# Patient Record
Sex: FEMALE | Race: WHITE | Employment: FULL TIME | URBAN - METROPOLITAN AREA
[De-identification: names, ages, dates, MRNs, and addresses within clinical notes are randomized per-mention and may not be internally consistent; named-entity substitution may affect disease eponyms.]

---

## 2018-03-09 ENCOUNTER — HOSPITAL ENCOUNTER (OUTPATIENT)
Dept: MAMMOGRAPHY | Age: 58
Discharge: HOME | End: 2018-03-09
Attending: FAMILY MEDICINE
Payer: COMMERCIAL

## 2018-03-09 DIAGNOSIS — Z12.39 SCREENING BREAST EXAMINATION: ICD-10-CM

## 2018-03-09 PROCEDURE — 77067 SCR MAMMO BI INCL CAD: CPT

## 2019-03-15 ENCOUNTER — HOSPITAL ENCOUNTER (OUTPATIENT)
Dept: MAMMOGRAPHY | Age: 59
Discharge: HOME | End: 2019-03-15
Attending: FAMILY MEDICINE
Payer: COMMERCIAL

## 2019-03-15 DIAGNOSIS — Z12.31 ENCOUNTER FOR SCREENING MAMMOGRAM FOR MALIGNANT NEOPLASM OF BREAST: ICD-10-CM

## 2019-03-15 PROCEDURE — 77067 SCR MAMMO BI INCL CAD: CPT

## 2020-06-09 ENCOUNTER — HOSPITAL ENCOUNTER (OUTPATIENT)
Dept: GENERAL RADIOLOGY | Age: 60
Discharge: HOME | End: 2020-06-09
Attending: FAMILY MEDICINE
Payer: COMMERCIAL

## 2020-06-09 DIAGNOSIS — R06.89 DYSPNEA AND RESPIRATORY ABNORMALITY: ICD-10-CM

## 2020-06-09 DIAGNOSIS — R06.00 DYSPNEA AND RESPIRATORY ABNORMALITY: ICD-10-CM

## 2020-06-09 PROCEDURE — 71046 X-RAY EXAM CHEST 2 VIEWS: CPT

## 2020-06-11 ENCOUNTER — HOSPITAL ENCOUNTER (INPATIENT)
Age: 60
LOS: 1 days | Discharge: HOME | DRG: 244 | End: 2020-06-13
Attending: SPECIALIST | Admitting: SPECIALIST
Payer: COMMERCIAL

## 2020-06-11 DIAGNOSIS — R00.1 BRADYCARDIA: ICD-10-CM

## 2020-06-11 DIAGNOSIS — R94.31 ECG ABNORMAL: ICD-10-CM

## 2020-06-11 PROBLEM — I44.1 SECOND DEGREE AV BLOCK, MOBITZ TYPE II: Status: ACTIVE | Noted: 2020-06-11

## 2020-06-11 LAB
ANION GAP SERPL CALC-SCNC: 3 MMOL/L (ref 5–15)
ATRIAL RATE: 78 BPM
BUN SERPL-MCNC: 10 MG/DL (ref 6–20)
BUN/CREAT SERPL: 11 (ref 12–20)
CALCIUM SERPL-MCNC: 9.1 MG/DL (ref 8.5–10.1)
CALCULATED P AXIS, ECG09: 67 DEGREES
CALCULATED R AXIS, ECG10: 19 DEGREES
CALCULATED T AXIS, ECG11: 62 DEGREES
CHLORIDE SERPL-SCNC: 107 MMOL/L (ref 97–108)
CO2 SERPL-SCNC: 29 MMOL/L (ref 21–32)
CREAT SERPL-MCNC: 0.95 MG/DL (ref 0.55–1.02)
DIAGNOSIS, 93000: NORMAL
ERYTHROCYTE [DISTWIDTH] IN BLOOD BY AUTOMATED COUNT: 12.5 % (ref 11.5–14.5)
GLUCOSE SERPL-MCNC: 98 MG/DL (ref 65–100)
HCT VFR BLD AUTO: 45.3 % (ref 35–47)
HGB BLD-MCNC: 15.8 G/DL (ref 11.5–16)
MAGNESIUM SERPL-MCNC: 2.4 MG/DL (ref 1.6–2.4)
MCH RBC QN AUTO: 31.6 PG (ref 26–34)
MCHC RBC AUTO-ENTMCNC: 34.9 G/DL (ref 30–36.5)
MCV RBC AUTO: 90.6 FL (ref 80–99)
NRBC # BLD: 0 K/UL (ref 0–0.01)
NRBC BLD-RTO: 0 PER 100 WBC
P-R INTERVAL, ECG05: 204 MS
PLATELET # BLD AUTO: 296 K/UL (ref 150–400)
PMV BLD AUTO: 10.7 FL (ref 8.9–12.9)
POTASSIUM SERPL-SCNC: 4.1 MMOL/L (ref 3.5–5.1)
Q-T INTERVAL, ECG07: 496 MS
QRS DURATION, ECG06: 78 MS
QTC CALCULATION (BEZET), ECG08: 399 MS
RBC # BLD AUTO: 5 M/UL (ref 3.8–5.2)
SODIUM SERPL-SCNC: 139 MMOL/L (ref 136–145)
T4 FREE SERPL-MCNC: 1.1 NG/DL (ref 0.8–1.5)
TSH SERPL DL<=0.05 MIU/L-ACNC: 3.07 UIU/ML (ref 0.36–3.74)
VENTRICULAR RATE, ECG03: 39 BPM
WBC # BLD AUTO: 5.6 K/UL (ref 3.6–11)

## 2020-06-11 PROCEDURE — 99152 MOD SED SAME PHYS/QHP 5/>YRS: CPT | Performed by: SPECIALIST

## 2020-06-11 PROCEDURE — 85027 COMPLETE CBC AUTOMATED: CPT

## 2020-06-11 PROCEDURE — B2111ZZ FLUOROSCOPY OF MULTIPLE CORONARY ARTERIES USING LOW OSMOLAR CONTRAST: ICD-10-PCS | Performed by: SPECIALIST

## 2020-06-11 PROCEDURE — 80048 BASIC METABOLIC PNL TOTAL CA: CPT

## 2020-06-11 PROCEDURE — 99218 HC RM OBSERVATION: CPT

## 2020-06-11 PROCEDURE — 83735 ASSAY OF MAGNESIUM: CPT

## 2020-06-11 PROCEDURE — C1760 CLOSURE DEV, VASC: HCPCS | Performed by: SPECIALIST

## 2020-06-11 PROCEDURE — 77030029065 HC DRSG HEMO QCLOT ZMED -B: Performed by: SPECIALIST

## 2020-06-11 PROCEDURE — 84439 ASSAY OF FREE THYROXINE: CPT

## 2020-06-11 PROCEDURE — 86618 LYME DISEASE ANTIBODY: CPT

## 2020-06-11 PROCEDURE — 93005 ELECTROCARDIOGRAM TRACING: CPT

## 2020-06-11 PROCEDURE — 84443 ASSAY THYROID STIM HORMONE: CPT

## 2020-06-11 PROCEDURE — 36415 COLL VENOUS BLD VENIPUNCTURE: CPT

## 2020-06-11 PROCEDURE — 93458 L HRT ARTERY/VENTRICLE ANGIO: CPT | Performed by: SPECIALIST

## 2020-06-11 PROCEDURE — 4A023N7 MEASUREMENT OF CARDIAC SAMPLING AND PRESSURE, LEFT HEART, PERCUTANEOUS APPROACH: ICD-10-PCS | Performed by: SPECIALIST

## 2020-06-11 PROCEDURE — 74011636320 HC RX REV CODE- 636/320: Performed by: SPECIALIST

## 2020-06-11 PROCEDURE — 77030004532 HC CATH ANGI DX IMP BSC -A: Performed by: SPECIALIST

## 2020-06-11 PROCEDURE — 77030003390 HC NDL ANGI MRTM -A: Performed by: SPECIALIST

## 2020-06-11 PROCEDURE — 74011250637 HC RX REV CODE- 250/637: Performed by: INTERNAL MEDICINE

## 2020-06-11 PROCEDURE — 77030018729 HC ELECTRD DEFIB PAD CARD -B: Performed by: SPECIALIST

## 2020-06-11 PROCEDURE — 74011000250 HC RX REV CODE- 250: Performed by: SPECIALIST

## 2020-06-11 PROCEDURE — C1894 INTRO/SHEATH, NON-LASER: HCPCS | Performed by: SPECIALIST

## 2020-06-11 PROCEDURE — 74011250636 HC RX REV CODE- 250/636: Performed by: SPECIALIST

## 2020-06-11 RX ORDER — HEPARIN SODIUM 200 [USP'U]/100ML
INJECTION, SOLUTION INTRAVENOUS
Status: COMPLETED | OUTPATIENT
Start: 2020-06-11 | End: 2020-06-11

## 2020-06-11 RX ORDER — ATORVASTATIN CALCIUM 20 MG/1
20 TABLET, FILM COATED ORAL
Status: DISCONTINUED | OUTPATIENT
Start: 2020-06-11 | End: 2020-06-13 | Stop reason: HOSPADM

## 2020-06-11 RX ORDER — FENTANYL CITRATE 50 UG/ML
INJECTION, SOLUTION INTRAMUSCULAR; INTRAVENOUS AS NEEDED
Status: DISCONTINUED | OUTPATIENT
Start: 2020-06-11 | End: 2020-06-11 | Stop reason: HOSPADM

## 2020-06-11 RX ORDER — SODIUM CHLORIDE 0.9 % (FLUSH) 0.9 %
5-40 SYRINGE (ML) INJECTION AS NEEDED
Status: DISCONTINUED | OUTPATIENT
Start: 2020-06-11 | End: 2020-06-13 | Stop reason: HOSPADM

## 2020-06-11 RX ORDER — MIDAZOLAM HYDROCHLORIDE 1 MG/ML
INJECTION, SOLUTION INTRAMUSCULAR; INTRAVENOUS AS NEEDED
Status: DISCONTINUED | OUTPATIENT
Start: 2020-06-11 | End: 2020-06-11 | Stop reason: HOSPADM

## 2020-06-11 RX ORDER — SODIUM CHLORIDE 9 MG/ML
125 INJECTION, SOLUTION INTRAVENOUS CONTINUOUS
Status: DISPENSED | OUTPATIENT
Start: 2020-06-11 | End: 2020-06-11

## 2020-06-11 RX ORDER — HYDROCORTISONE SODIUM SUCCINATE 100 MG/2ML
100 INJECTION, POWDER, FOR SOLUTION INTRAMUSCULAR; INTRAVENOUS
Status: ACTIVE | OUTPATIENT
Start: 2020-06-11 | End: 2020-06-12

## 2020-06-11 RX ORDER — DIPHENHYDRAMINE HYDROCHLORIDE 50 MG/ML
25 INJECTION, SOLUTION INTRAMUSCULAR; INTRAVENOUS
Status: ACTIVE | OUTPATIENT
Start: 2020-06-11 | End: 2020-06-12

## 2020-06-11 RX ORDER — SODIUM CHLORIDE 0.9 % (FLUSH) 0.9 %
5-40 SYRINGE (ML) INJECTION EVERY 8 HOURS
Status: DISCONTINUED | OUTPATIENT
Start: 2020-06-11 | End: 2020-06-13 | Stop reason: HOSPADM

## 2020-06-11 RX ORDER — LIDOCAINE HYDROCHLORIDE 10 MG/ML
INJECTION INFILTRATION; PERINEURAL AS NEEDED
Status: DISCONTINUED | OUTPATIENT
Start: 2020-06-11 | End: 2020-06-11 | Stop reason: HOSPADM

## 2020-06-11 RX ORDER — PANTOPRAZOLE SODIUM 40 MG/1
40 TABLET, DELAYED RELEASE ORAL
Status: DISCONTINUED | OUTPATIENT
Start: 2020-06-12 | End: 2020-06-13 | Stop reason: HOSPADM

## 2020-06-11 RX ORDER — ATORVASTATIN CALCIUM 10 MG/1
10 TABLET, FILM COATED ORAL DAILY
COMMUNITY

## 2020-06-11 RX ADMIN — ATORVASTATIN CALCIUM 20 MG: 20 TABLET, FILM COATED ORAL at 21:57

## 2020-06-11 RX ADMIN — Medication 10 ML: at 21:58

## 2020-06-11 NOTE — CONSULTS
HISTORY OF PRESENTING ILLNESS      Shamir Montelongo is a 61 y.o. female with hypertension, hypercholesterolemia and recent complaints of cp, fatigue and sob. Positive family hx for CAD. She had normal LVF on echocardiogram with Dr. Alesha Tran 6/10/200. She also had EKG showing 2nd degree AVB 2:1. She presented today for cardiac catheterization which showed normal coronaries and an EF of 70%. EKG in post procedure area showing 2:1 heart block with heart rates ranging 35-42bpm. Patient remains very symptomatic. Symptoms began 3 weeks ago. She denies any additional illnesses or medical hx since that time. She does endorse pulling 5 ticks off of her body 4-5 weeks ago. Today's labwork unrevealing.       ACTIVE PROBLEM LIST     Patient Active Problem List    Diagnosis Date Noted    Second degree AV block, Mobitz type II 06/11/2020     Priority: 1 - One    History of recurrent UTIs 04/14/2014    Unspecified vitamin D deficiency 11/05/2012    Fibromyalgia 05/05/2012    Rheumatoid factor positive 05/05/2012    Symptomatic menopausal or female climacteric states 02/24/2012    Insomnia, unspecified 05/23/2010    Pure hypercholesterolemia 11/13/2009           PAST MEDICAL HISTORY     Past Medical History:   Diagnosis Date    Fibromyalgia     GERD (gastroesophageal reflux disease)     Kidney stone     Pure hypercholesterolemia     Second degree AV block, Mobitz type II 6/11/2020           PAST SURGICAL HISTORY     Past Surgical History:   Procedure Laterality Date    HX GYN  1999    Hysterectomy    HX GYN      tubal    HX ORTHOPAEDIC      right elbow           ALLERGIES     No Known Allergies       FAMILY HISTORY     Family History   Problem Relation Age of Onset    Cancer Mother         stomach    Asthma Father     Heart Disease Father     Asthma Brother     negative for cardiac disease       SOCIAL HISTORY     Social History     Socioeconomic History    Marital status:      Spouse name: Not on file    Number of children: Not on file    Years of education: Not on file    Highest education level: Not on file   Tobacco Use    Smoking status: Former Smoker     Last attempt to quit: 1991     Years since quittin.9    Smokeless tobacco: Never Used   Substance and Sexual Activity    Alcohol use: Yes     Comment: occasionally    Drug use: No    Sexual activity: Yes     Partners: Male         MEDICATIONS     Current Facility-Administered Medications   Medication Dose Route Frequency    0.9% sodium chloride infusion  125 mL/hr IntraVENous CONTINUOUS    sodium chloride (NS) flush 5-40 mL  5-40 mL IntraVENous Q8H    sodium chloride (NS) flush 5-40 mL  5-40 mL IntraVENous PRN    diphenhydrAMINE (BENADRYL) injection 25 mg  25 mg IntraVENous ONCE PRN    hydrocortisone Sod Succ (PF) (SOLU-CORTEF) injection 100 mg  100 mg IntraVENous ONCE PRN       I have reviewed the nurses notes, vitals, problem list, allergy list, medical history, family, social history and medications. REVIEW OF SYMPTOMS      General: +fatigue, presyncope, Pt denies excessive weight gain or loss. Pt is able to conduct ADL's  HEENT: Denies blurred vision, headaches, hearing loss, epistaxis and difficulty swallowing. Respiratory: +sob, Denies cough, congestion, shortness of breath, JUNG, wheezing or stridor. Cardiovascular: +cp, Denies, palpitations, edema or PND  Gastrointestinal: Denies poor appetite, indigestion, abdominal pain or blood in stool  Genitourinary: Denies hematuria, dysuria, increased urinary frequency  Musculoskeletal: Denies joint pain or swelling from muscles or joints  Neurologic: Denies tremor, paresthesias, headache, or sensory motor disturbance  Psychiatric: Denies confusion, insomnia, depression  Integumentray: Denies rash, itching or ulcers.   Hematologic: Denies easy bruising, bleeding       PHYSICAL EXAMINATION      Vitals:    20 0915 20 1145   BP: 169/54 (!) 124/98   Pulse: 70 (!) 50   Resp: 10 19   Temp: 97.8 °F (36.6 °C)    SpO2: 98% (!) 86%   Weight: 211 lb 10.3 oz (96 kg)    Height: 6' (1.829 m)      General: Well developed, in no acute distress. HEENT: No jaundice, oral mucosa moist, no oral ulcers  Neck: Supple, no stiffness, no lymphadenopathy, supple  Heart:  Normal S1/S2 negative S3 or S4. Regular, no murmur, gallop or rub, no jugular venous distention  Respiratory: Clear bilaterally x 4, no wheezing or rales  Abdomen:   Soft, non-tender, bowel sounds are active.   Extremities:  No edema, normal cap refill, no cyanosis. Musculoskeletal: No clubbing, no deformities  Neuro: A&Ox3, speech clear, gait stable, cooperative, no focal neurologic deficits  Skin: Skin color is normal. No rashes or lesions. Non diaphoretic, moist.  Vascular: 2+ pulses symmetric in all extremities       DIAGNOSTIC DATA      EK:1 AVB       LABORATORY DATA      Lab Results   Component Value Date/Time    WBC 5.6 2020 09:14 AM    HGB 15.8 2020 09:14 AM    HCT 45.3 2020 09:14 AM    PLATELET 667  09:14 AM    MCV 90.6 2020 09:14 AM      Lab Results   Component Value Date/Time    Sodium 139 2020 09:14 AM    Potassium 4.1 2020 09:14 AM    Chloride 107 2020 09:14 AM    CO2 29 2020 09:14 AM    Anion gap 3 (L) 2020 09:14 AM    Glucose 98 2020 09:14 AM    BUN 10 2020 09:14 AM    Creatinine 0.95 2020 09:14 AM    BUN/Creatinine ratio 11 (L) 2020 09:14 AM    GFR est AA >60 2020 09:14 AM    GFR est non-AA >60 2020 09:14 AM    Calcium 9.1 2020 09:14 AM    Bilirubin, total 0.5 2014 11:14 AM    Alk. phosphatase 41 2014 11:14 AM    Protein, total 6.0 2014 11:14 AM    Albumin 4.0 2014 11:14 AM    Globulin 2.9 10/28/2010 11:36 AM    A-G Ratio 2.0 2014 11:14 AM    ALT (SGPT) 27 2014 11:14 AM           ASSESSMENT      1. Second Degree AVB 2:1  2. Hypertension  3. Fatigue  4. Pre-syncope       PLAN     Plan to admit for possible dual chamber pacer with [employer] tomorrow morning. Discussed plan with nursing. OK to resume home meds aside from [employer], cardiac diet, NPO after midnight, left arm PIV. Stat lyme titer, TSH, and Mag. Will update Dr. Denise Castellanos on consult and current plan. Discuss plan with patient who is in agreement and will notify her  via cell phone. FOLLOW-UP       Thank you, Stephanie Elizabeth MD and Dr. Jersey Sauceda for allowing me to participate in the care of this extraordinarily pleasant female. Please do not hesitate to contact me for further questions/concerns.        Ellwood Hue, NP      [address]  Formerly named [facility] [address]  [phone] / [phone] Fax   [phone] / [phone] Fax

## 2020-06-11 NOTE — Clinical Note
TRANSFER - IN REPORT:     Verbal report received from: ernst. Report consisted of patient's Situation, Background, Assessment and   Recommendations(SBAR). Opportunity for questions and clarification was provided. Assessment completed upon patient's arrival to unit and care assumed. Patient transported with a Registered Nurse and [address] [place] [address] / [facility] [employer].

## 2020-06-11 NOTE — H&P
Date of Surgery Update:  Sharla Ferguson was seen and examined. History and physical has been reviewed. The patient has been examined. There have been no significant clinical changes since the completion of the originally dated History and Physical.    Signed By: Jole Bajwa MD     June 11, 2020 9:19 AM       CP, fatigue  Normal LVF on echo. 2nd degree AVB on EKG: may ask Maritza to see. Cecily Irwin 1960   Office/Outpatient Visit  Visit Date: Wed, Timur 10, 2020 3:00 pm  Provider: Iraida Sevilla MD (Assistant: Staci Huang RN )  Location: [facility])[address] [place]. [address] [phone]    Electronically signed by Anastacia Elias MD on  06/10/2020 04:33:02 PM                           Subjective:    CC: Ms. Ryder Putnam is a 61year old White female. Her primary care physician is Domenica Isaac MD.  She is here to follow up with the doctor regarding previous testing, echocardiogram - 6- - today. She presents today with a complaint of shortness of breath. The primary reason for today's visit is evaluation of the following: abnormal EKG on 6- - today with echo, hypercholesterolemia, and essential hypertension. No EKG done today    HPI:         MD Notes: sinus, 2nd degree AV block, 2:1 vs. non-condcuted PAC's Abnormal electrocardiogram [ECG] [EKG] noted. Regarding hypertension:  Type Primary Hypertension           Hypercholesterolemia: Current treatment includes Crestor and diet. Regarding dyspnea/shortness of breath: This has been noted for the past month. Associated symptoms include chest tightness, arm/leg heaviness/weak and dizzines, faint feeling. This tends to be worse with exertion. The shortness of breath is better rest.            Regarding chest pain:  MD Notes: Father had CABG and stents in his 52's   The discomfort is located primarily in the center of the chest.  The pain initially began months ago.   She characterizes the pain as tightness. It seems to be worse with exertion. Pain improves with rest.  Associated symptoms include dyspnea and fatigue. Paplitations/Arrhythmias:  Regarding cardiac arrhythmia (unspecified): She describes the sensation as flutter. The average duration of each episode is less than one minute. Regarding dyspnea/shortness of breath: This tends to be worse with exertion. The shortness of breath is better rest.  Echo: EF 60%, milld MR,TR. Bradycardia, unspecified noted. Associated symptoms include dizziness and near syncope. She denies syncope. Coronary Artery Disease: Current symptoms include angina, chest pain and shortness of breath. Atrioventricular block, second degree noted. Associated symptoms include dizziness and near syncope. She denies syncope. ROS:   GENERAL:  Denies recent weight gain or weight loss. EYES:  Denies double vision. ENT:  Denies tinnitus. No nose bleeding. ENDOCRINE:  Negative for temperature intolerances and excessive sweating. CARDIOVASCULAR:  Please see HPI. RESPIRATORY:   No chronic cough, hemoptysis or pleuritic pain. GASTROINTESTINAL:  Positive for acid reflux symptoms. :  No dysuria, polyuria or hematuria. HEMATOLOGIC/LYMPHATIC:  Negative for easy bruising and excessive bleeding. NEUROLOGICAL:  Negative for seizures and vertigo.       Past Medical History / Family History / Social History:     Last Reviewed on 6/10/2020 03:31 PM by Pete Howard  Past Medical History:     Hypercholesterolemia  Hypertension   Gastroesophageal Reflux Disease   Fibromyalgia  Osteoarthritis   Migraine Headaches  Vertigo   Chronic anemia Chronic anxiety Bipolar disorder Depression Insomnia   Vitamin D def  Sinusitis  Allergic rhinitis     Surgical History:   Surgical/Procedural History:   Hysterectomy: with Oopherectomy  R arm surgery  Eye surgery     Family History:   Father:   ; Positive for Coronary Artery Disease and bypass, stents;     Social History:   Social History:   Marital Status:    Children: 2 children     Tobacco/Alcohol/Supplements:   Last Reviewed on 6/10/2020 03:31 PM by Mignon Gonzalez  TOBACCO/ALCOHOL/SUPPLEMENTS   Tobacco: Past history of cigarette smoking, but has quit. Alcohol: Drinks alcohol occasionally. Caffeine:  She admits to consuming caffeine via coffee ( 2 servings per day ). Substance Abuse History:   Last Reviewed on 6/10/2020 03:31 PM by Mignon Perez  Substance Use/Abuse:   None     Mental Health History:   Last Reviewed on 6/10/2020 03:31 PM by Mignon Perez    Communicable Diseases (eg STDs): Last Reviewed on 6/10/2020 03:31 PM by Mignon Perez    Current Problems:   Last Reviewed on 6/10/2020 03:31 PM by Mignon Perez  Fatigue  Pure hypercholesterolemia  Essential (primary) hypertension  Bradycardia, unspecified  Shortness of breath  Chest pain, unspecified  Dizziness and giddiness  Malaise and fatigue  Abnormal electrocardiogram [ECG] [EKG]    Allergies:   Last Reviewed on 6/10/2020 03:31 PM by Mignon Perez  No Known Allergies.     Current Medications:   Last Reviewed on 6/10/2020 03:31 PM by Mignon Perez  ibuprofen 800 mg oral tablet [take 1 tablet (800 mg) by oral route every 8 hrs prn]  DULoxetine 60 mg oral capsule,delayed release (enteric coated) [take 1 capsule (60 mg) by oral route once daily]  omeprazole 40 mg oral capsule,delayed release (enteric coated) [take 1 capsule (40 mg) by oral route once daily before a meal]  rosuvastatin 10 mg oral tablet [take 1 tablet (10 mg) by oral route once daily]  budesonide-formoterol 160-4.5 mcg/actuation Inhalation HFA Aerosol Inhaler [inhale 2 puffs by inhalation route 2 times per day in the morning andevening]  valACYclovir 1 gram oral tablet [take 1 tablet (1,000 mg) by oral route 2 times per day prn]  zolpidem 10 mg oral tablet [take 1 tablet (10 mg) by oral route once daily at bedtime]  montelukast 10 mg oral tablet [take 1 tablet (10 mg) by oral route once daily in the evening]  Myrbetriq 50 mg oral Tablet, Extended Release 24 hr [take 1 tablet (50 mg) by oral route once daily swallowing whole with water. Do not crush, chew and/or divide.]  SUMAtriptan  [100mg prn]  tamsulosin 0.4 mg oral capsule [prn]  meclizine 25 mg oral tablet [take 1 tablet (25 mg) by oral route 1 hour before exposure to motion prn]    Objective:    Vitals:     Current: 6/10/2020 3:24:56 PM  Ht:  6 ft, 0 in; Wt: 213 lbs;  BMI: 28. 9BP: 195/74 mm Hg (left arm, sitting);  P: 43 bpm    Repeat:   3:28:20 PM  BP:   201/81mm Hg (left arm, standing)   Exams:   GENERAL:  Alert, oriented to person, place and time. HEENT:  Pinkish palpebral  conjunctivae. Anicteric sclerae. NECK:  No jugular vein engorgement. No bruit. CHEST: Equal expansion. Clear breath sounds. No rales, no wheezing. Heart: Grade 2/6 systolic ejection murmur at the left sternal border area. Bradycardic but regular   ABDOMEN:  Soft. Normal active bowel sounds. No tenderness. EXTREMITIES:  No pitting pedal edema. Equal pulses bilaterally. NEURO:  Grossly intact.       Assessment:     R94.31   Abnormal electrocardiogram [ECG] [EKG]     I10   Essential (primary) hypertension     E78.0   Pure hypercholesterolemia     R06.02   Shortness of breath     R07.2   Precordial pain     R00.2   Palpitations     R06.00   Dyspnea, unspecified     R00.1   Bradycardia, unspecified     I25.110   Atherosclerotic heart disease of native coronary artery with unstable angina pectoris     I44.1   Atrioventricular block, second degree       ORDERS:     Procedures Ordered:     72686  Education and train for pt self-mgmt by qualified, nonphysician, june 30 minutes; individual pt  (Send-Out)          XCATH  Cardiac Cath  (In-House)          X  Holter Monitor  (In-House)          Other Orders:     [id number]  Queried Patient for Tobacco Use  (Send-Out)            LDL-C < 100 mg/dL (Send-Out)          4086F  Aspirin or clopidogrel prescribed (CAD)  (Send-Out)              Plan:     Essential (primary) hypertension      Patient Education Handouts:     COV Heart Healthy Diet      COV Hypertension      Dyspnea, unspecified  CAD # 197: CAD Lipid Control LDL < 100 mg/dL # 6: CAD w/ Antiplatelet Therapy Aspirin or Clopidogrel Prescribed 1. Medication list has been reviewed. Start the following medication(s):  losartan, aspirin 81 mg daily. Smoking Status:  Nonsmoker   2. Advised the patient regarding diet, exercise, and lifestyle modification. Offered admission but declined. No driving. EP consult after cardiac cath. Explained indication for pacemaker. 3.  The patient to call the office if there is any change in her cardiac symptoms. 4.  Explained to the patient the importance of controlling her cardiac risk factors. Testing/Procedures:  Holter Monitor - to be done today - 24 hour Cardiac Catheterization  Explained to the patient the indication, procedure, risks, and benefits of cardiac catheterization. The patient understands  and wishes to proceed with the cath to be performed as an outpatient this week at [facility] by Dr. Juan Alberto Plascencia. Schedule a follow up appointment in 2 weeks. Orders:     [id number]  Queried Patient for Tobacco Use  (Send-Out)          23076  Education and train for pt self-mgmt by qualified, nonphysician, ea 30 minutes; individual pt  (Send-Out)          XCATH  Cardiac Cath  (In-House)          X  Holter Monitor  (In-House)            LDL-C < 100 mg/dL  (Send-Out)          4086F  Aspirin or clopidogrel prescribed (CAD)  (Send-Out)            Patient Recommendations: For  Dyspnea, unspecified:    1. Your medication list has been reviewed. Start the following medication(s):  losartan, aspirin 81 mg daily. 2.  You have been advised regarding diet, exercise, and lifestyle. modification.     3.  Please call the office if there is any change in your cardiac symptoms. 4.  Explained to you the importance of controlling your cardiac risk factors. You need to have the following test/procedure(s) done:  Holter monitor to be done today - 24 hour Cardiac Catheterization:  The patient understands and wishes to proceed with the cath to be performed as an outpatient this week. at [facility] by Dr. Juan Alberto Plascencia. Schedule a follow-up visit in 2 weeks.

## 2020-06-11 NOTE — PROGRESS NOTES
9290    Patient arrived. ID and allergies verified verbally with patient. Pt voices understanding of procedure to be performed. Consent obtained. Pt prepped for procedure. 1105    TRANSFER - OUT REPORT:    Verbal report given to Rocky Huerta RN (name) on Alpha Severe  being transferred to CATH LAB (unit) for ordered procedure       Report consisted of patients Situation, Background, Assessment and   Recommendations(SBAR). Information from the following report(s) SBAR was reviewed with the receiving nurse. Lines:       Opportunity for questions and clarification was provided. Patient transported with:   Registered Nurse          11:43 AM    TRANSFER - IN REPORT:    Verbal report received from RT Cecile (name) on Mohit Severe  being received from [facility] (unit) for routine progression of care      Report consisted of patients Situation, Background, Assessment and   Recommendations(SBAR). Information from the following report(s) Procedure Summary was reviewed with the receiving nurse. Opportunity for questions and clarification was provided. Assessment completed upon patients arrival to unit and care assumed. 11:50 AM    Patient arrived in [facility] 281 as low as 37 during procedure    Placed on defibrillator monitor   Hooked up to AP pads  Monitoring  Denies pain  Awaiting EP NP consult, NP aware patient in CLPO and HR      1210    NP in CLPO = new orders received  Patient awake and talking  Monitoring      12:58 PM    TRANSFER - OUT REPORT:    Verbal report given to Rafi Adam RN (name) on Alpha Severe  being transferred to ICU (unit) for routine progression of care       Report consisted of patients Situation, Background, Assessment and   Recommendations(SBAR). Information from the following report(s) SBAR, Procedure Summary and Cardiac Rhythm Second degree heart block type 2, PVCs was reviewed with the receiving nurse.     Lines:   Peripheral IV 06/11/20 Right Antecubital (Active)   Site Assessment Clean, dry, & intact 6/11/2020 12:15 PM   Phlebitis Assessment 0 6/11/2020 12:15 PM   Infiltration Assessment 0 6/11/2020 12:15 PM   Dressing Status Clean 6/11/2020 12:15 PM   Dressing Type Transparent 6/11/2020 12:15 PM   Hub Color/Line Status Pink; Infusing 6/11/2020 12:15 PM       Peripheral IV 06/11/20 Left Antecubital (Active)   Site Assessment Clean, dry, & intact 6/11/2020 12:45 PM   Phlebitis Assessment 0 6/11/2020 12:45 PM   Infiltration Assessment 0 6/11/2020 12:45 PM   Dressing Status Clean 6/11/2020 12:45 PM   Dressing Type Transparent 6/11/2020 12:45 PM   Hub Color/Line Status Pink 6/11/2020 12:45 PM        Opportunity for questions and clarification was provided.       Patient transported with:   Monitor   RNs

## 2020-06-11 NOTE — Clinical Note
TRANSFER - OUT REPORT:     Verbal report given to: Wiliam Parsons 61 . Report consisted of patient's Situation, Background, Assessment and   Recommendations(SBAR). Opportunity for questions and clarification was provided. Patient transported with a Registered Nurse. Patient transported to: [facility].

## 2020-06-11 NOTE — PROGRESS NOTES
1323- TRANSFER - IN REPORT:    Verbal report received from [facility], RN(name) on Flower Burgos  being received from cath lab(unit) for routine progression of care      Report consisted of patients Situation, Background, Assessment and   Recommendations(SBAR). Information from the following report(s) SBAR, Kardex, Intake/Output, MAR, Recent Results and Cardiac Rhythm sinus princess was reviewed with the receiving nurse. Opportunity for questions and clarification was provided. Assessment completed upon patients arrival to unit and care assumed. Pt extremely princess in 30s-40s. Doctors are aware. Dr. Deb Tellez aware. Pt on defibrillator in case of the need to externally pace pt. Pads on patient. Patient on monitor X3.       1500- pt  at bedside and is wearing a mask. 1620- pt comfortable. Talking on the phone. [address] Yoan Mg is the daughter, and plans to see pt tomorrow. 1900- Bedside and Verbal shift change report given to OUMOU Rose (oncoming nurse) by Sheryle Dus, RN (offgoing nurse). Report included the following information SBAR, Kardex, Intake/Output, MAR, Recent Results and Cardiac Rhythm 2nd degree type 2.

## 2020-06-11 NOTE — CONSULTS
[facility]     Name: Coty Holder MRN: [id number]   : 1960 Hospital: [facility]   Date: 2020        Impression Plan   1. Second degree heart blook  2. HLD   3. GERD               · S/P cardia cath  · EP consulted for possible pacemaker  · Pacer pads in place  · Home statin  · PPI  · NPO after midnight       Pt is critically ill in the setting of second degree heart block. Critical care time spent with pt exclusive of procedures was 30 minutes    Radiology  ( personally reviewed) CXR 2020 without acute cardiopulmonary process   ABG No results for input(s): PHI, PO2I, PCO2I in the last 72 hours. Subjective     Patient is a 61 y.o. female who is admitted to the ICU post-cath while she awaits pacemaker placement evaluation. She had been evaluated by Dr. Mian Umanzor for shortness of breath and abnormal EKG and underwent cardiac cath today that showed normal coronary arteries. She was also noted to have a second degree heart block and is to be evaluated by EP for a possible pacemaker. She denies any complaints of shortness of breath at rest, chest pain, palpitations, dizziness. Review of Systems:  A comprehensive review of systems was negative except for that written in the HPI. Past Medical History:   Diagnosis Date    Fibromyalgia     GERD (gastroesophageal reflux disease)     Kidney stone     Pure hypercholesterolemia     Second degree AV block, Mobitz type II 2020      Past Surgical History:   Procedure Laterality Date    HX GYN  1999    Hysterectomy    HX GYN      tubal    HX ORTHOPAEDIC      right elbow       Prior to Admission medications    Medication Sig Start Date End Date Taking? Authorizing Provider   atorvastatin (LIPITOR) 20 mg tablet Take 20 mg by mouth daily.    Yes Provider, Historical   zolpidem (AMBIEN) 10 mg tablet TAKE 1 TABLET BY MOUTH NIGHTLY AS NEEDED 3/30/14  Yes Shira Ramirez NP   butalbital-acetaminophen-caffeine (FIORICET) -40 mg per tablet Take 1 Tab by mouth every six (6) hours as needed for Headache. 3/20/14  Yes Brandon Walsh MD   valACYclovir (VALTREX) 1 g tablet Take 1 Tab by mouth two (2) times a day. 13  Yes Brandon Walsh MD   naproxen sodium (ALEVE) 220 mg cap Take  by mouth. Yes Provider, Historical   aspirin delayed-release 81 mg tablet Take  by mouth daily. Yes Provider, Historical   Dexlansoprazole (DEXILANT) 60 mg CpDM Take 1 Cap by mouth daily. 11  Yes Brandon Walsh MD     Current Facility-Administered Medications   Medication Dose Route Frequency    sodium chloride (NS) flush 5-40 mL  5-40 mL IntraVENous Q8H     No Known Allergies   Social History     Tobacco Use    Smoking status: Former Smoker     Last attempt to quit: 1991     Years since quittin.9    Smokeless tobacco: Never Used   Substance Use Topics    Alcohol use: Yes     Comment: occasionally      Family History   Problem Relation Age of Onset    Cancer Mother         stomach    Asthma Father     Heart Disease Father     Asthma Brother           Laboratory: I have personally reviewed the critical care flowsheet and labs. Recent Labs     20  0914   WBC 5.6   HGB 15.8   HCT 45.3        Recent Labs     20  1235 20  0914   NA  --  139   K  --  4.1   CL  --  107   CO2  --  29   GLU  --  98   BUN  --  10   CREA  --  0.95   CA  --  9.1   MG 2.4  --        Objective:     Mode Rate Tidal Volume Pressure FiO2 PEEP                    Vital Signs:     TMAX(24)      Intake/Output:   Last shift:         Last 3 shifts: No intake/output data recorded. [id number] intake or output data in the 24 hours ending 20 1501  EXAM:   GENERAL: well developed and in no distress, HEENT:  PERRL, EOMI, no alar flaring or epistaxis, oral mucosa moist without cyanosis, NECK:  no jugular vein distention, no retractions, no thyromegaly or masses, LUNGS: CTAB, respirations non-labored, HEART: Bradycardic but regular, with no MGR; no edema is present, ABDOMEN:  soft with no tenderness, bowel sounds present, EXTREMITIES:  warm with no cyanosis, SKIN:  no jaundice or ecchymosis and NEUROLOGIC:  alert and oriented, grossly non-focal    Rachelle Zapata MD  [facility] [address] [facility]

## 2020-06-11 NOTE — Clinical Note
Bilateral chest and draped. Wet prep solution applied at: 1125. Wet prep solution dried at: 1128. Wet prep elapsed drying time: 3 mins.

## 2020-06-11 NOTE — Clinical Note
TRANSFER - OUT REPORT:     Verbal report given to: Ophelia. Report consisted of patient's Situation, Background, Assessment and   Recommendations(SBAR). Opportunity for questions and clarification was provided. Patient transported with a Registered Nurse.

## 2020-06-11 NOTE — PROGRESS NOTES
Spiritual Care Assessment/Progress Note  [address]      NAME: Monica Kwong      MRN: [id number]  AGE: 61 y.o.  SEX: female  Mormonism Affiliation: Unknown   Language: English     6/11/2020     Total Time (in minutes): 8     Spiritual Assessment begun in [facility] 3 INTENSIVE CARE through conversation with:         [x]Patient        [] Family    [] Friend(s)        Reason for Consult: Initial/Spiritual assessment, patient floor     Spiritual beliefs: (Please include comment if needed)     [] Identifies with a alcira tradition:         [] Supported by a alcira community:            [] Claims no spiritual orientation:           [] Seeking spiritual identity:                [] Adheres to an individual form of spirituality:           [x] Not able to assess:                           Identified resources for coping:      [] Prayer                               [] Music                  [] Guided Imagery     [] Family/friends                 [] Pet visits     [] Devotional reading                         [x] Unknown     [] Other:                                    Interventions offered during this visit: (See comments for more details)    Patient Interventions: Affirmation of emotions/emotional suffering, Normalization of emotional/spiritual concerns, Initial/Spiritual assessment, patient floor, Initial visit           Plan of Care:     [] Support spiritual and/or cultural needs    [] Support AMD and/or advance care planning process      [] Support grieving process   [] Coordinate Rites and/or Rituals    [] Coordination with community clergy   [] No spiritual needs identified at this time   [] Detailed Plan of Care below (See Comments)  [] Make referral to Music Therapy  [] Make referral to Pet Therapy     [] Make referral to Addiction services  [] Make referral to [facility]  [] Make referral to Spiritual Care Partner  [] No future visits requested        [x] Follow up visits as needed     Comments:  visited pt, Miss Nohemy Boothe at the ICU for initial spiritual assessment. Pt was in her bed, alert and comfortable. She  welcomed  with a broad smile.  noticed after introducing himself that pt's lunch had just been served.  thus advised her of  availability as needed, and provided her space to have her lunchd. Pt thanked  for the visit. Spiritual care is still available as needed or upon request.     Visited by: Alexsander Jonas.   To sheila cam: [phone]

## 2020-06-11 NOTE — PROGRESS NOTES
Reason for Admission: For Pacemaker placement,second degree block,symptomatic bradycardia                   RUR Score:     observation                Plan for utilizing home health: There are no identified home health needs @ this rosales    PCP: First and Last name:  Dr Alisson Dennison   Name of Practice: Family Practice   Are you a current patient: Yes/No: yes   Approximate date of last visit: few months ago   Can you participate in a virtual visit with your PCP: yes                    Current Advanced Directive/Advance Care Plan: full code,no AMD on file                         Transition of Care Plan:                    I met with pt.'s  and pt as an introductory visit. .I reviewed the observation letter with pt / and pt received her copy. Pt has   Pt has [employer] insurance. Per ,the hope is for pt to have her pacemaker placed late tomorrow morning. At home ,pt has two entry steps into their [place] home. Pt is independent in all aspects. At this time,therwe are no identified discharge needs @ this time but will follow pt for disposition needs.     Enrico Meredith

## 2020-06-12 ENCOUNTER — APPOINTMENT (OUTPATIENT)
Dept: GENERAL RADIOLOGY | Age: 60
DRG: 244 | End: 2020-06-12
Attending: INTERNAL MEDICINE
Payer: COMMERCIAL

## 2020-06-12 PROBLEM — R07.9 CHEST PAIN: Status: ACTIVE | Noted: 2020-06-12

## 2020-06-12 LAB
ANION GAP SERPL CALC-SCNC: 3 MMOL/L (ref 5–15)
B BURGDOR IGG+IGM SER-ACNC: <0.91 ISR (ref 0–0.9)
BUN SERPL-MCNC: 7 MG/DL (ref 6–20)
BUN/CREAT SERPL: 10 (ref 12–20)
CALCIUM SERPL-MCNC: 7.2 MG/DL (ref 8.5–10.1)
CHLORIDE SERPL-SCNC: 113 MMOL/L (ref 97–108)
CO2 SERPL-SCNC: 26 MMOL/L (ref 21–32)
CREAT SERPL-MCNC: 0.71 MG/DL (ref 0.55–1.02)
ERYTHROCYTE [DISTWIDTH] IN BLOOD BY AUTOMATED COUNT: 12.5 % (ref 11.5–14.5)
GLUCOSE SERPL-MCNC: 81 MG/DL (ref 65–100)
HCT VFR BLD AUTO: 41.4 % (ref 35–47)
HGB BLD-MCNC: 14.2 G/DL (ref 11.5–16)
MAGNESIUM SERPL-MCNC: 1.8 MG/DL (ref 1.6–2.4)
MCH RBC QN AUTO: 31.1 PG (ref 26–34)
MCHC RBC AUTO-ENTMCNC: 34.3 G/DL (ref 30–36.5)
MCV RBC AUTO: 90.6 FL (ref 80–99)
NRBC # BLD: 0 K/UL (ref 0–0.01)
NRBC BLD-RTO: 0 PER 100 WBC
PHOSPHATE SERPL-MCNC: 3.6 MG/DL (ref 2.6–4.7)
PLATELET # BLD AUTO: 264 K/UL (ref 150–400)
PMV BLD AUTO: 11 FL (ref 8.9–12.9)
POTASSIUM SERPL-SCNC: 3.2 MMOL/L (ref 3.5–5.1)
RBC # BLD AUTO: 4.57 M/UL (ref 3.8–5.2)
SODIUM SERPL-SCNC: 142 MMOL/L (ref 136–145)
WBC # BLD AUTO: 7.2 K/UL (ref 3.6–11)

## 2020-06-12 PROCEDURE — 74011250637 HC RX REV CODE- 250/637: Performed by: INTERNAL MEDICINE

## 2020-06-12 PROCEDURE — 0JH606Z INSERTION OF PACEMAKER, DUAL CHAMBER INTO CHEST SUBCUTANEOUS TISSUE AND FASCIA, OPEN APPROACH: ICD-10-PCS | Performed by: INTERNAL MEDICINE

## 2020-06-12 PROCEDURE — 02HK3JZ INSERTION OF PACEMAKER LEAD INTO RIGHT VENTRICLE, PERCUTANEOUS APPROACH: ICD-10-PCS | Performed by: INTERNAL MEDICINE

## 2020-06-12 PROCEDURE — 99152 MOD SED SAME PHYS/QHP 5/>YRS: CPT | Performed by: INTERNAL MEDICINE

## 2020-06-12 PROCEDURE — 77030033138 HC SUT PGA STRATFX J&J -B: Performed by: INTERNAL MEDICINE

## 2020-06-12 PROCEDURE — 99153 MOD SED SAME PHYS/QHP EA: CPT | Performed by: INTERNAL MEDICINE

## 2020-06-12 PROCEDURE — 77030002933 HC SUT MCRYL J&J -A: Performed by: INTERNAL MEDICINE

## 2020-06-12 PROCEDURE — 99218 HC RM OBSERVATION: CPT

## 2020-06-12 PROCEDURE — 74011250636 HC RX REV CODE- 250/636: Performed by: NURSE PRACTITIONER

## 2020-06-12 PROCEDURE — 77030038613 HC SUT PDS STRATA SPIRL J&J -B: Performed by: INTERNAL MEDICINE

## 2020-06-12 PROCEDURE — C1893 INTRO/SHEATH, FIXED,NON-PEEL: HCPCS | Performed by: INTERNAL MEDICINE

## 2020-06-12 PROCEDURE — 74011250637 HC RX REV CODE- 250/637: Performed by: NURSE PRACTITIONER

## 2020-06-12 PROCEDURE — 77030018547 HC SUT ETHBND1 J&J -B: Performed by: INTERNAL MEDICINE

## 2020-06-12 PROCEDURE — 74011250636 HC RX REV CODE- 250/636

## 2020-06-12 PROCEDURE — 74011250636 HC RX REV CODE- 250/636: Performed by: INTERNAL MEDICINE

## 2020-06-12 PROCEDURE — C1785 PMKR, DUAL, RATE-RESP: HCPCS | Performed by: INTERNAL MEDICINE

## 2020-06-12 PROCEDURE — 74011000272 HC RX REV CODE- 272: Performed by: INTERNAL MEDICINE

## 2020-06-12 PROCEDURE — 77030018729 HC ELECTRD DEFIB PAD CARD -B: Performed by: INTERNAL MEDICINE

## 2020-06-12 PROCEDURE — 71045 X-RAY EXAM CHEST 1 VIEW: CPT

## 2020-06-12 PROCEDURE — C1898 LEAD, PMKR, OTHER THAN TRANS: HCPCS | Performed by: INTERNAL MEDICINE

## 2020-06-12 PROCEDURE — 77030018673: Performed by: INTERNAL MEDICINE

## 2020-06-12 PROCEDURE — 33208 INSRT HEART PM ATRIAL & VENT: CPT | Performed by: INTERNAL MEDICINE

## 2020-06-12 PROCEDURE — 84100 ASSAY OF PHOSPHORUS: CPT

## 2020-06-12 PROCEDURE — 65660000000 HC RM CCU STEPDOWN

## 2020-06-12 PROCEDURE — 80048 BASIC METABOLIC PNL TOTAL CA: CPT

## 2020-06-12 PROCEDURE — 77030041279 HC DRSG PRMSL AG MDII -B: Performed by: INTERNAL MEDICINE

## 2020-06-12 PROCEDURE — 74011636320 HC RX REV CODE- 636/320: Performed by: INTERNAL MEDICINE

## 2020-06-12 PROCEDURE — 02H63JZ INSERTION OF PACEMAKER LEAD INTO RIGHT ATRIUM, PERCUTANEOUS APPROACH: ICD-10-PCS | Performed by: INTERNAL MEDICINE

## 2020-06-12 PROCEDURE — 85027 COMPLETE CBC AUTOMATED: CPT

## 2020-06-12 PROCEDURE — 74011000250 HC RX REV CODE- 250: Performed by: INTERNAL MEDICINE

## 2020-06-12 PROCEDURE — 36415 COLL VENOUS BLD VENIPUNCTURE: CPT

## 2020-06-12 PROCEDURE — 83735 ASSAY OF MAGNESIUM: CPT

## 2020-06-12 DEVICE — IPG W1DR01 AZURE XT DR MRI WL USA BCP
Type: IMPLANTABLE DEVICE | Status: FUNCTIONAL
Brand: AZURE™ XT DR MRI SURESCAN™

## 2020-06-12 DEVICE — LEAD 5076-58 MRI US RCMCRD
Type: IMPLANTABLE DEVICE | Status: FUNCTIONAL
Brand: CAPSUREFIX NOVUS MRI™ SURESCAN®

## 2020-06-12 DEVICE — LEAD PCMKR CAPSUR FIX NOVUS 52 --: Type: IMPLANTABLE DEVICE | Status: FUNCTIONAL

## 2020-06-12 RX ORDER — MORPHINE SULFATE 2 MG/ML
2 INJECTION, SOLUTION INTRAMUSCULAR; INTRAVENOUS ONCE
Status: DISCONTINUED | OUTPATIENT
Start: 2020-06-12 | End: 2020-06-12

## 2020-06-12 RX ORDER — LABETALOL HCL 20 MG/4 ML
20 SYRINGE (ML) INTRAVENOUS
Status: DISCONTINUED | OUTPATIENT
Start: 2020-06-12 | End: 2020-06-13 | Stop reason: HOSPADM

## 2020-06-12 RX ORDER — LIDOCAINE HYDROCHLORIDE AND EPINEPHRINE 10; 10 MG/ML; UG/ML
INJECTION, SOLUTION INFILTRATION; PERINEURAL AS NEEDED
Status: DISCONTINUED | OUTPATIENT
Start: 2020-06-12 | End: 2020-06-12 | Stop reason: HOSPADM

## 2020-06-12 RX ORDER — ZOLPIDEM TARTRATE 5 MG/1
5 TABLET ORAL
Status: DISCONTINUED | OUTPATIENT
Start: 2020-06-12 | End: 2020-06-13 | Stop reason: HOSPADM

## 2020-06-12 RX ORDER — HYDROMORPHONE HYDROCHLORIDE 2 MG/ML
2 INJECTION, SOLUTION INTRAMUSCULAR; INTRAVENOUS; SUBCUTANEOUS ONCE
Status: DISCONTINUED | OUTPATIENT
Start: 2020-06-12 | End: 2020-06-12

## 2020-06-12 RX ORDER — CEFAZOLIN SODIUM 1 G/3ML
INJECTION, POWDER, FOR SOLUTION INTRAMUSCULAR; INTRAVENOUS AS NEEDED
Status: DISCONTINUED | OUTPATIENT
Start: 2020-06-12 | End: 2020-06-12 | Stop reason: HOSPADM

## 2020-06-12 RX ORDER — DULOXETIN HYDROCHLORIDE 60 MG/1
60 CAPSULE, DELAYED RELEASE ORAL
COMMUNITY

## 2020-06-12 RX ORDER — GENTAMICIN SULFATE 80 MG/100ML
80 INJECTION, SOLUTION INTRAVENOUS ONCE
Status: DISCONTINUED | OUTPATIENT
Start: 2020-06-12 | End: 2020-06-12

## 2020-06-12 RX ORDER — MIDAZOLAM HYDROCHLORIDE 1 MG/ML
INJECTION, SOLUTION INTRAMUSCULAR; INTRAVENOUS AS NEEDED
Status: DISCONTINUED | OUTPATIENT
Start: 2020-06-12 | End: 2020-06-12 | Stop reason: HOSPADM

## 2020-06-12 RX ORDER — DULOXETIN HYDROCHLORIDE 30 MG/1
60 CAPSULE, DELAYED RELEASE ORAL
Status: DISCONTINUED | OUTPATIENT
Start: 2020-06-12 | End: 2020-06-13 | Stop reason: HOSPADM

## 2020-06-12 RX ORDER — ONDANSETRON 2 MG/ML
4 INJECTION INTRAMUSCULAR; INTRAVENOUS
Status: DISCONTINUED | OUTPATIENT
Start: 2020-06-12 | End: 2020-06-13 | Stop reason: HOSPADM

## 2020-06-12 RX ORDER — CEFAZOLIN SODIUM 1 G/3ML
2 INJECTION, POWDER, FOR SOLUTION INTRAMUSCULAR; INTRAVENOUS ONCE
Status: DISCONTINUED | OUTPATIENT
Start: 2020-06-12 | End: 2020-06-12

## 2020-06-12 RX ORDER — ACETAMINOPHEN 325 MG/1
650 TABLET ORAL
Status: DISCONTINUED | OUTPATIENT
Start: 2020-06-12 | End: 2020-06-13 | Stop reason: HOSPADM

## 2020-06-12 RX ORDER — HEPARIN SODIUM 200 [USP'U]/100ML
INJECTION, SOLUTION INTRAVENOUS
Status: COMPLETED | OUTPATIENT
Start: 2020-06-12 | End: 2020-06-12

## 2020-06-12 RX ORDER — DIPHENHYDRAMINE HYDROCHLORIDE 50 MG/ML
INJECTION, SOLUTION INTRAMUSCULAR; INTRAVENOUS AS NEEDED
Status: DISCONTINUED | OUTPATIENT
Start: 2020-06-12 | End: 2020-06-12 | Stop reason: HOSPADM

## 2020-06-12 RX ORDER — HYDROCODONE BITARTRATE AND ACETAMINOPHEN 5; 325 MG/1; MG/1
1 TABLET ORAL
Status: DISCONTINUED | OUTPATIENT
Start: 2020-06-12 | End: 2020-06-13 | Stop reason: HOSPADM

## 2020-06-12 RX ORDER — GENTAMICIN SULFATE 80 MG/100ML
80 INJECTION, SOLUTION INTRAVENOUS ONCE
Status: COMPLETED | OUTPATIENT
Start: 2020-06-12 | End: 2020-06-12

## 2020-06-12 RX ORDER — BUPIVACAINE HYDROCHLORIDE 5 MG/ML
INJECTION, SOLUTION EPIDURAL; INTRACAUDAL AS NEEDED
Status: DISCONTINUED | OUTPATIENT
Start: 2020-06-12 | End: 2020-06-12 | Stop reason: HOSPADM

## 2020-06-12 RX ORDER — CEPHALEXIN 500 MG/1
500 CAPSULE ORAL 3 TIMES DAILY
Qty: 15 CAP | Refills: 0 | Status: SHIPPED | OUTPATIENT
Start: 2020-06-12 | End: 2020-06-17

## 2020-06-12 RX ORDER — HYDRALAZINE HYDROCHLORIDE 20 MG/ML
20 INJECTION INTRAMUSCULAR; INTRAVENOUS ONCE
Status: COMPLETED | OUTPATIENT
Start: 2020-06-12 | End: 2020-06-12

## 2020-06-12 RX ORDER — ONDANSETRON 2 MG/ML
INJECTION INTRAMUSCULAR; INTRAVENOUS AS NEEDED
Status: DISCONTINUED | OUTPATIENT
Start: 2020-06-12 | End: 2020-06-12 | Stop reason: HOSPADM

## 2020-06-12 RX ORDER — FENTANYL CITRATE 50 UG/ML
INJECTION, SOLUTION INTRAMUSCULAR; INTRAVENOUS AS NEEDED
Status: DISCONTINUED | OUTPATIENT
Start: 2020-06-12 | End: 2020-06-12 | Stop reason: HOSPADM

## 2020-06-12 RX ORDER — HYDRALAZINE HYDROCHLORIDE 20 MG/ML
INJECTION INTRAMUSCULAR; INTRAVENOUS
Status: COMPLETED
Start: 2020-06-12 | End: 2020-06-12

## 2020-06-12 RX ORDER — SODIUM CHLORIDE 0.9 % (FLUSH) 0.9 %
5-40 SYRINGE (ML) INJECTION EVERY 8 HOURS
Status: DISCONTINUED | OUTPATIENT
Start: 2020-06-12 | End: 2020-06-13 | Stop reason: HOSPADM

## 2020-06-12 RX ORDER — SODIUM CHLORIDE 0.9 % (FLUSH) 0.9 %
5-40 SYRINGE (ML) INJECTION AS NEEDED
Status: DISCONTINUED | OUTPATIENT
Start: 2020-06-12 | End: 2020-06-13 | Stop reason: HOSPADM

## 2020-06-12 RX ADMIN — ATORVASTATIN CALCIUM 20 MG: 20 TABLET, FILM COATED ORAL at 21:24

## 2020-06-12 RX ADMIN — ACETAMINOPHEN 650 MG: 325 TABLET ORAL at 14:14

## 2020-06-12 RX ADMIN — PANTOPRAZOLE SODIUM 40 MG: 40 TABLET, DELAYED RELEASE ORAL at 16:49

## 2020-06-12 RX ADMIN — HYDRALAZINE HYDROCHLORIDE 20 MG: 20 INJECTION INTRAMUSCULAR; INTRAVENOUS at 12:52

## 2020-06-12 RX ADMIN — DULOXETINE 60 MG: 30 CAPSULE, DELAYED RELEASE ORAL at 21:24

## 2020-06-12 RX ADMIN — MORPHINE SULFATE 2 MG: 2 INJECTION, SOLUTION INTRAMUSCULAR; INTRAVENOUS at 13:25

## 2020-06-12 RX ADMIN — ZOLPIDEM TARTRATE 5 MG: 5 TABLET ORAL at 21:24

## 2020-06-12 RX ADMIN — ACETAMINOPHEN 650 MG: 325 TABLET ORAL at 20:14

## 2020-06-12 RX ADMIN — HYDROCODONE BITARTRATE AND ACETAMINOPHEN 1 TABLET: 5; 325 TABLET ORAL at 16:49

## 2020-06-12 RX ADMIN — Medication 10 ML: at 21:24

## 2020-06-12 RX ADMIN — WATER 2 G: 1 INJECTION INTRAMUSCULAR; INTRAVENOUS; SUBCUTANEOUS at 18:05

## 2020-06-12 RX ADMIN — Medication 10 ML: at 21:25

## 2020-06-12 NOTE — PROGRESS NOTES
1900: Bedside and Verbal shift change report given to OUMOU Rose (oncoming nurse) by Zurdo Arnold RN (offgoing nurse). Report included the following information SBAR, Kardex, ED Summary, Procedure Summary, Intake/Output, Recent Results, Cardiac Rhythm 2nd degree HB type II and Quality Measures. Primary Nurse Kush Sena RN and Zurdo Arnold RN performed a dual skin assessment on this patient No impairment noted  Mac score is 19.    2000: Shift Assessment completed, see flowsheet. No s/s of distress noted at this time and patient denies pain. Mental Status: Patient alert and orientedx4 and able to follow simple commands at this time. Respiratory: Patient on RA with clear lung sounds noted at this time. Cardiac: 2nd Degree HB Type II. 40s. GI/: Active bowel sounds noted at this time. Right groin site C/D/I.    2157: Medications administered at this time. 2200: CHG Bath completed at this time. 0000: Reassessment completed. No changes from previous assessment, see flowsheet. No s/s of distress noted at this time and patient denies pain. 0200: No s/s of distress noted at this time. 0400: Reassessment completed. No changes from previous assessment, see flowsheet. No s/s of distress noted at this time and patient denies pain. 0413: Labs drawn and sent at this time. 0600: CHG Bath completed at this time. 0700: Bedside and Verbal shift change report given to OUMOU Blankenship (oncoming nurse) by OUMOU Rose (offgoing nurse). Report included the following information SBAR, Kardex, ED Summary, Procedure Summary, Intake/Output, Recent Results, Cardiac Rhythm 2nd Degree HB type II and Quality Measures.

## 2020-06-12 NOTE — PROGRESS NOTES
Transition of care note:   Observation status so no RUR identified    Plan: 1. Pacemaker placement today. 2.If stable after pacemaker placement,,the plan is for cardiology to discharge pt later today. 3.Pt has a follow-up appointment with cardiologist,Dr Fuentes Pineda on 6/18/2020 @ 2pm.  4.Pt will also follow-up with Dr Robin Valverde. 5.I have requested for the CM specialist to please make a follow-up PCP appointment with Dr Fredi Wan. 6.There are no identified home health needs or rehab needs identified. 7.Pt will be disharged home with family assistance. 8. Information on [facility] placed on pt.'s aVS.  9. or daughter will transport family home when discharged.     Daiana Moore

## 2020-06-12 NOTE — PROCEDURES
Cardiac Electrophysiology Report      PATIENT INFORMATION     Patient Name: Dakota Ashton MRN: [id number]                 Study Date: 2020    YOB: 1960  Age: 61 y.o. Gender: female      Procedure:  Duran Fraga    Referring Physician:  Betsy Hogan MD and Dr. Milad Starks     Duty Name   Electrophysiologist Marilyn Mclain MD   Monitor Froilan Olmstead RN   Circulator Millicent Plascencia RN; HASMUKH Leal       PATIENT HISTORY     27-year-old female hospitalized with symptomatic 2-1 heart block without reversible etiology. Previous echocardiogram demonstrated preserved LV function. She now presents for implantation of a dual-chamber pacemaker. PROCEDURE     The patient was brought to the Cardiac Electrophysiology laboratory in a post-absorptive, fasting state. Informed consent was obtained. A peripheral IV was in place. Continuous electrocardiographic, blood pressure, O2 saturation and  CO2 monitoring was initiated. Intravenous antibiotics were administered pre-operatively. Self-adhesive cardioversion patches were positioned on the chest.  Conscious sedation was effectuated according to protocol. The patient was then prepped and draped in the usual sterile fashion. A left subclavian venogram was performed and demonstrated patency of the vein. A 50/50 mixture of lidocaine (1%) with epinephrine and bupivicaine (0.5%) was utilized for local anesthesia. An incision was performed medial to the left delto-pectoral groove. Sharp and blunt dissection was carried down to the level of the pre-pectoralis fascia. Hemostasis was maintained with electrocautery. Extra-thoracic, subclavian venous access was obtained twice and sheaths were placed using the modified Seldinger technique.  Permanent pace/sense leads were advanced under fluoroscopic guidance and positioned in the right atrium and ventricle where stable pacing and sensing characteristics were encountered. The sheaths were peeled away and the leads were anchored to the pre-pectoralis fascia using O-ethibond non-absorbable suture material. A device pocket was then fashioned and flushed copiously with antibiotic solution. A pacemaker generator was connected to the leads and the generator was placed into the pocket. The device was secured to the pocket using O-ethibond, non-absorbable suture material. The pocket was then closed in three layers using 2-O PDS, 3-O monocryl, 4-O monocryl absorbable suture material and dermabond. The skin was closed using a sub-cuticular technique. A bio-occlusive dressing were applied to the skin. The patient remained hemodynamically stable, tolerated the procedure well and was transferred in stable condition. There were no immediate complications encountered during the procedure. There was minimal blood loss and no specimen were removed. LEAD & GENERATOR DATA       Model # Serial #   Generator Medtronic [id number]   Atrial Lead Medtronic [id number]   Ventricular Lead Medtronic [id number]       PACE/SENSE DATA      Sensed Wave (mV) Threshold (V) Impedance (Ohms)   Atrium  3.3  1.25  589   Ventricle  11.0  0.75  1159       FINAL PROGRAMMING     Mode Lower Rate (ppm) Upper Rate (ppm)   AAIR-DDDR 60 130       MEDICATION SUMMARY     Medication Route Unit Total   Gentamycin IV mg 80   Ancef IV grams 2   Fentanyl IV micrograms  100   Versed IV grams  7       RADIOLOGY SUMMARY      Total   Fluoro Time (minutes)  2.3      Dose Area Product (mGy)  54       CONCLUSIONS     1. Successful implantation of a dual-chamber pacemaker. 2. Keflex 500 mg po tid x 5 days. 3. CXR now  4. Wound check in EP clinic in 14 days. 5. Follow up in EP clinic in 3 months or earlier if necessary. 6. Follow up with  Bandar Kwok MD and Dr. Erin Donahue  as scheduled.         Thank you, Bandar Kwok MD and Dr. Wendy Romano for involving me in the care of this extraordinarily pleasant female.        Fred Ferrell MD  Cardiac Electrophysiology / Cardiology    [address]  [address] [facility], [address] [facility] [address], [address]  [address]  [phone] / [phone] Fax      [phone] / [phone] Fax

## 2020-06-12 NOTE — DISCHARGE SUMMARY
Cardiology Discharge Summary     Patient ID:       Ricardo Connors  [id number]  72 y.o.  1960    Admit Date: 6/11/2020    Discharge Date: 6/13/2020     Admitting Physician: Denise Cisse MD   PCP: Herminia Padilla MD    Discharge Physician: Imani Zambrano MD, [facility] [place]    Consults:                      Case management     Admission Diagnoses: ECG abnormal [R94.31]  Second degree AV block, Mobitz type II [I44.1]  Chest pain [R07.9]    Discharge Diagnoses: Active Problems:    Second degree AV block, Mobitz type II (6/11/2020)      Chest pain (6/12/2020)        Discharge Condition: Stable    Cardiology Procedures this Admission:  Diagnostic left heart catheterization, dual chamber PPM     Hospital Course:    Ricardo Connors was admitted for chest pain and fatigue. Found to be in  symptomatic 2-1 heart block without reversible etiology. Cath with no significant CAD. Previous echocardiogram demonstrated preserved LV function. A dual-chamber pacemaker was implanted on 6/12/2020. Post procedure chest xray demonstrated no pneumothorax. Case mangement was consulted for discharge planning. The patient/family was kept apprised of his disease process and treatment plan of care. After receiving maximum benefit from his in-patient hospitalization, he was ready for discharge. At the time of discharge  He was up ambulating and hemodynamically stable. Discharge Exam:     Visit Vitals  /74   Pulse 79   Temp 97.7 °F (36.5 °C)   Resp 14   Ht 6' (1.829 m)   Wt 210 lb (95.3 kg)   LMP 01/17/1999   SpO2 96%   BMI 28.48 kg/m²     See Final Progress Note    Disposition: home    Patient Instructions:   Current Discharge Medication List      START taking these medications    Details   cephALEXin (KEFLEX) 500 mg capsule Take 1 Cap by mouth three (3) times daily for 5 days.   Qty: 15 Cap, Refills: 0         CONTINUE these medications which have NOT CHANGED    Details   atorvastatin (LIPITOR) 20 mg tablet Take 20 mg by mouth daily. zolpidem (AMBIEN) 10 mg tablet TAKE 1 TABLET BY MOUTH NIGHTLY AS NEEDED  Qty: 30 Tab, Refills: 3      butalbital-acetaminophen-caffeine (FIORICET) -40 mg per tablet Take 1 Tab by mouth every six (6) hours as needed for Headache. Qty: 15 Tab, Refills: 0    Associated Diagnoses: Headache(784.0)      valACYclovir (VALTREX) 1 g tablet Take 1 Tab by mouth two (2) times a day. 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 Associated Diagnoses: Fibromyalgia      aspirin delayed-release 81 mg tablet Take  by mouth daily. Dexlansoprazole (DEXILANT) 60 mg CpDM Take 1 Cap by mouth daily. Qty: 30 Cap, Refills: 5    Associated Diagnoses: Esophageal reflux             Referenced discharge instructions provided by nursing for diet and activity. Follow-up with Evelyn Vazquez in 2 weeks.       Signed:  Jazmine Avalos MD  6/13/2020  1:40 PM

## 2020-06-12 NOTE — PROGRESS NOTES
Dre Carvalho MD, [address] [facility] [address], [facility] 33 [phone]      IMPRESSION and RECOMMENDATIONS     1.  2nd Degree AVB:  For PPM today. Has F/U with Dr. Irma Loving 6/18/20 @ 2pm.    Will sign off. I have discussed this plan with the patient. She appears to understand this plan and wishes to proceed ahead. Subjective:       Pt without complaints. Objective:     Patient Vitals for the past 16 hrs:   BP Temp Pulse Resp SpO2   06/12/20 0820   (!) 38     06/12/20 0800 151/54  77 14 94 %   06/12/20 0700 161/57  74 17 93 %   06/12/20 0600 170/60  (!) 41 18 94 %   06/12/20 0500 142/50  (!) 40 12 93 %   06/12/20 0400 141/56 98 °F (36.7 °C) (!) 41 12 92 %   06/12/20 0300 155/59  (!) 51 16 94 %   06/12/20 0200 158/55  (!) 52 18 93 %   06/12/20 0100 147/48  (!) 41 15 92 %   06/12/20 0032   (!) 42 18 92 %   06/12/20 0000 152/56 98.3 °F (36.8 °C) (!) 52 18 91 %   06/11/20 2347   (!) 41     06/11/20 2300 147/65  (!) 41 13 93 %   06/11/20 2219 177/60  (!) 43 12 96 %   06/11/20 2200 187/59  (!) 42 12 95 %   06/11/20 2100 167/65  (!) 42 10 97 %   06/11/20 2000 153/60 98.4 °F (36.9 °C) (!) 43 16 96 %   06/11/20 1900 163/71  (!) 44 22 96 %   06/11/20 1800 158/61  (!) 59 15 94 %       HEENT Exam:     WNL         Lung Exam:     The patient is not dyspneic. Breath sounds are heard equally in all lung fields. There are no wheezes, rales, rhonchi, or rubs heard on auscultation. Heart Exam:     The rhythm is regular and princess. The PMI is in the 5th intercostal space of the MCL. Apical impulse is normal. S1 is regular. S2 is physiologic. There is no S3, S4 gallop, murmur, click, or rub. Abdomen Exam:     Benign. Extremities Exam:     No cyanosis, clubbing, edema. Distal pulses intact.            Lab Results   Component Value Date/Time    Glucose 81 06/12/2020 04:13 AM    Sodium 142 06/12/2020 04:13 AM    Potassium 3.2 (L) 06/12/2020 04:13 AM    Chloride 113 (H) 06/12/2020 04:13 AM    CO2 26 06/12/2020 04:13 AM    BUN 7 06/12/2020 04:13 AM    Creatinine 0.71 06/12/2020 04:13 AM    Calcium 7.2 (L) 06/12/2020 04:13 AM     Recent Labs     06/12/20  0413 06/11/20  0914   WBC 7.2 5.6   HGB 14.2 15.8   HCT 41.4 45.3    296     No results for input(s): ALT, AP, TBIL, TBILI, TP, ALB, GLOB, GGT in the last 72 hours. No lab exists for component: SGOT, GPT  No results for input(s): INR, PTP, APTT, INREXT in the last 72 hours. No results for input(s): CPK, CKMB, TNIPOC in the last 72 hours. No lab exists for component: TROPONINI, ITNL  No results for input(s): TROIQ in the last 72 hours.   Lab Results   Component Value Date/Time    Cholesterol, total 156 03/21/2014 11:14 AM    HDL Cholesterol 30 (L) 03/21/2014 11:14 AM    LDL, calculated 96 03/21/2014 11:14 AM    Triglyceride 149 03/21/2014 11:14 AM    CHOL/HDL Ratio 5.0 05/21/2010 11:15 AM

## 2020-06-12 NOTE — PROGRESS NOTES
0710-Bedside report received from CLAY Caruso RN. SBAR, Kardex, Procedure Summary, Intake/Output, MAR, Recent Results and Cardiac Rhythm 2nd degree AVB with plans for PPM this morning were discussed. Pt resting in bed without complaint or distress. Will assess. Pattie Patel RN  1100-Pt off unit for pacemaker placement at this time. Ellis COELLO  1219-Report received post pacer from Alberto Garland RN. Anticipate patient arrival back to room in 10-15min. Ellis COELLO  1240-Pt returned from procedure and noted to be very hypertensive. Dr. Vilma Cramer notified. Order received for hydralazine 20mg IV. Will administer. Ellis COELLO  1250-Pt complaining of severe pain at the left/center of her back with difficulty breathing. She describes the pain as sharp/stabbing. Imaging department notified of need for stat CXR; cardiology NP Kendell on unit and informed. Pt also continues to complain of sever headache and is crying/moaning in pain. Pt's daughter is at the bedside and aware of all events. Ellis COELLO  1330-Pt has received hydralazine and morphine, blood pressure is improving. She states pain in shoulder/back is now much improved. CXR without pneumothorax. Continue to monitor. Ellis COELLO  1500-Pt comfortable and talking on the phone. Blood pressure continues to be WNL and pt c/o mild pain to the back/shoulder but nothing severe as previously experienced. Ellis COELLO  2095-Shift Summary: The patient has been comfortable with minimal pain since pain was resolved and blood pressure lowered. Has not required further medication for blood pressure. Medicated for complaints of moderate pain with hydrocodone PO X 1. Has been paced in the 90's. No outstanding issues since immediate post-op issues resolved.  Ellis COELLO

## 2020-06-12 NOTE — DISCHARGE INSTRUCTIONS
Cardiology Follow up with Caludine Del Toro MD on June 18th, 2020 at [facility], [place] [address].  Kobe Johnson 33  (0657 [facility] Visit Follow-Up Instructions/Information    You may have a in home follow up visit set up with Nate Leos or may wish to contact Nate Leos to set-up a visit:    What are we? [facility] is an in-home urgent care service staffed with emergency trained medical teams. We come to your home in a vehicle stocked with medical supplies and technology. An ER physician is always available if needed. When? As a part of your hospital follow-up, an appointment has been/ or can be set up for us to come see you. Usually, this will be 24-72 hours after you leave the hospital or as needed. [facility] is open 9am-9pm, 7 days a week, 365 days a year, including holidays. Why? We know that you cannot always get to your doctor after being in the hospital and that your doctor is not always available when you need them. Once your workup is complete, we'll call in your prescriptions, update your family doctor, and handle billing with your insurance so you can focus on feeling better, faster without leaving home. How much? We accept most major health insurance plans, including Medicaid, Medicare, and [facility] [address] [facility], [employer], and [employer]. We also accept: credit, debit, health savings account (HSA), health reimbursement account (HRA) and flexible spending account (FSA) payments. [facility]'s prices compare to conventional urgent care facilities, but we bring the care to you. How to reach us? Getting care is easy- use our mobile marshall ([employer]), website ([email or web address]) or call us [phone].               Pacemaker  Discharge Instructions    Please make sure you have received your Temporary Pacemaker identification card with your discharge instructions      MEDICATIONS         Take only the medications prescribed to you at discharge. ACTIVITY         Return to your normal activity, except as noted below. o Do not lift anything heavier than 10 pounds for 4 weeks with the affected arm. This is how long it takes the muscles to heal, and the leads inside your heart to stabilize their position. o Do not reach above your head with the affected arm for 4 weeks, doing so increases the risk of lead dislodgement.    o It is, however, important to move the affected arm to prevent shoulder stiffness and locking. o Avoid tight clothes or unnecessary pressure over your incision (such as bra straps or seat belts). If it is tender or sensitive to clothing, cover the incision with a soft dressing or pad.  o Questions about driving are individualized and should be discussed with one of the EP Physicians prior to discharge. SHOWERING         Leave the bandage over your incision for 14 days after the Pacemaker implant. You bandage will be removed in clinic in 14 days.  It is important to keep the bandaged area clean and dry. You may shower around the site until the bandage is removed in clinic. Thereafter, you may shower after the bandage is removed, washing it gently with soap and water. Do not apply any lotions, powders, or perfumes to the incision line.  Avoid submerging your incision in water (tub baths, hot tubs, or swimming) for four weeks.  Underneath the dressing. o If you have white steri-strips over your incision (underneath the gauze dressing), they will curl up at the end and fall off, usually within 10 days. Do not pull them off.  - OR -   o You may have a different type of closure for the incision. If Dermabond Adhesive was used to close your incision, you will receive a separate instruction sheet. DISCHARGE PRECAUTIONS         Record your temperature every day, at the same time, for 3 weeks after your implant.   A temperature of 100.5 F, or higher, can be the first sign of infection. This should be reported to your Doctor immediately.  You can have an MRI after 6 weeks. You must be aware that any strong magnet or magnetic field can affect your Pacemaker. In general, be careful of metal detectors, heavy machinery, and any area where arc-welding is performed. Avoid metal detectors such as the ones in security checkpoints at [facility] or [address] [place] [address]. When approaching a security checkpoint show your Pacemaker ID Card to security personnel and ask to be hand searched.  Always tell your doctor or dentist that you have a Pacemaker. Antibiotics may be prescribed before certain procedures.  If you use a cell phone, hold it on the opposite side from where your Pacemaker is implanted.  Your temporary identification will be given to you with these instructions. Keep your Pacemaker card in your wallet or on your person at all times. You should receive your permanent card in 8 weeks. If you do not receive your permanent card, please call the office at [phone]. TAKING YOUR PULSE         Take your pulse the same time every day, preferably in the morning.  Sit down and rest for 5 minutes prior to taking your pulse.  Take your pulse for 1 full minute, use a clock or stop watch with a second hand.  To feel your pulse, use the first two fingers of one hand; place them on the thumb side of the wrist of the opposite hand. The pulse will be steady, regular and throbbing.  Call the EP Lab Doctors if your pulse is less than 40 beats per minute. SYMPTOMS THAT NEED TO BE REPORTED IMMEDIATELY         Temperature more than 100.4 F     Redness or warmth at the incision site, or pain for longer than the first 5 days after the implant.  Drainage from the incision site.  Swelling around the incision site.  Shortness of breath.  Rapid heart rate or palpitations.  Dizziness, lightheadedness, fainting.      Slow pulse below 40 beats per minute.  REMEMBER: If you feel something is an emergency or cannot be handled over the phone, call 911 or go to the closest emergency room.           Irma Rhodes MD  Cardiac Electrophysiology / Cardiology    [address], [address] [facility], [address]  [facility]         Pako Constantino  [phone] / [phone] Fax       [phone] / [phone] Fax

## 2020-06-12 NOTE — ROUTINE PROCESS
Attempted to schedule PCP JAKE apt with Dr. Sudarshan Walsh, left voicemail and currently awaiting return call

## 2020-06-12 NOTE — PROGRESS NOTES
1900: Bedside and Verbal shift change report given to Matteo Stearns RN (oncoming nurse) by Zuhair Cruz RN (offgoing nurse). Report included the following information SBAR, Kardex, ED Summary, Procedure Summary, Intake/Output, Recent Results, Cardiac Rhythm Paced and Quality Measures. Primary Nurse Nate Golden RN and OUMOU Blankenship performed a dual skin assessment on this patient Impairment noted- see wound doc flow sheet  Mac score is 19.    2000: Shift Assessment completed, see flowsheet. No s/s of distress noted at this time. Patient complaining of tolerable pain at this time. Ice pack replaced at this time. Mental Status: Patient alert and orientedx4 and able to follow simple commands at this time. Respiratory: 2L NC with clear lung sounds noted at this time. Cardiac: Paced. 80s            GI/: Active bowel sounds noted at this time.  at bedside and has been updated on the plan of care at this time. Patient ambulated to commode at this time. Patient tolerated walking without no difficulty at this time. Medications administered at this time. 2124: Medications administered at this time. 2200: No s/s of distress noted at this time. 0000: Reassessment completed. No changes from previous assessment, see flowsheet. No s/s of distress noted at this time and patient denies pain. 0219: Medications administered at this time. 0400: Reassessment completed. No changes from previous assessment, see flowsheet. No s/s of distress noted at this time and patient denies pain. 0413: Labs drawn and sent at this time. 0600: No s/s of distress noted at this time. 0700: Bedside and Verbal shift change report given to Shaina Morrison RN (oncoming nurse) by Matteo Stearns RN (offgoing nurse). Report included the following information SBAR, Kardex, ED Summary, Procedure Summary, Intake/Output, Recent Results, Cardiac Rhythm Paced and Quality Measures.

## 2020-06-12 NOTE — ADVANCED PRACTICE NURSE
1300 Pt with transient HN post procedure. Treated with hydralazine IV. Developed severe back pain /osterior L shoulder with inability to take deep breath. Chest xray with no penumothorax. D/W Dr Jeovanny Petersen. Will give IV morphine and cont to observe. 14:20 reexamine pt. /60. Pain improved. Reviewed post procedure instructions with pt and dtr.       Will watch overnight on tele and anticiapte d/c in am.

## 2020-06-13 VITALS
WEIGHT: 210 LBS | BODY MASS INDEX: 28.44 KG/M2 | OXYGEN SATURATION: 93 % | DIASTOLIC BLOOD PRESSURE: 55 MMHG | RESPIRATION RATE: 16 BRPM | TEMPERATURE: 97.7 F | HEART RATE: 82 BPM | HEIGHT: 72 IN | SYSTOLIC BLOOD PRESSURE: 111 MMHG

## 2020-06-13 LAB
ANION GAP SERPL CALC-SCNC: 4 MMOL/L (ref 5–15)
BUN SERPL-MCNC: 13 MG/DL (ref 6–20)
BUN/CREAT SERPL: 14 (ref 12–20)
CALCIUM SERPL-MCNC: 8.6 MG/DL (ref 8.5–10.1)
CHLORIDE SERPL-SCNC: 108 MMOL/L (ref 97–108)
CO2 SERPL-SCNC: 26 MMOL/L (ref 21–32)
CREAT SERPL-MCNC: 0.91 MG/DL (ref 0.55–1.02)
ERYTHROCYTE [DISTWIDTH] IN BLOOD BY AUTOMATED COUNT: 12.9 % (ref 11.5–14.5)
GLUCOSE SERPL-MCNC: 139 MG/DL (ref 65–100)
HCT VFR BLD AUTO: 42.3 % (ref 35–47)
HGB BLD-MCNC: 14.4 G/DL (ref 11.5–16)
MCH RBC QN AUTO: 31.4 PG (ref 26–34)
MCHC RBC AUTO-ENTMCNC: 34 G/DL (ref 30–36.5)
MCV RBC AUTO: 92.2 FL (ref 80–99)
NRBC # BLD: 0 K/UL (ref 0–0.01)
NRBC BLD-RTO: 0 PER 100 WBC
PLATELET # BLD AUTO: 262 K/UL (ref 150–400)
PMV BLD AUTO: 10.8 FL (ref 8.9–12.9)
POTASSIUM SERPL-SCNC: 4.1 MMOL/L (ref 3.5–5.1)
RBC # BLD AUTO: 4.59 M/UL (ref 3.8–5.2)
SODIUM SERPL-SCNC: 138 MMOL/L (ref 136–145)
WBC # BLD AUTO: 9.8 K/UL (ref 3.6–11)

## 2020-06-13 PROCEDURE — 74011250636 HC RX REV CODE- 250/636: Performed by: INTERNAL MEDICINE

## 2020-06-13 PROCEDURE — 74011250637 HC RX REV CODE- 250/637: Performed by: INTERNAL MEDICINE

## 2020-06-13 PROCEDURE — 80048 BASIC METABOLIC PNL TOTAL CA: CPT

## 2020-06-13 PROCEDURE — 36415 COLL VENOUS BLD VENIPUNCTURE: CPT

## 2020-06-13 PROCEDURE — 74011000250 HC RX REV CODE- 250: Performed by: INTERNAL MEDICINE

## 2020-06-13 PROCEDURE — 77010033678 HC OXYGEN DAILY

## 2020-06-13 PROCEDURE — 85027 COMPLETE CBC AUTOMATED: CPT

## 2020-06-13 RX ADMIN — PANTOPRAZOLE SODIUM 40 MG: 40 TABLET, DELAYED RELEASE ORAL at 09:01

## 2020-06-13 RX ADMIN — HYDROCODONE BITARTRATE AND ACETAMINOPHEN 1 TABLET: 5; 325 TABLET ORAL at 09:01

## 2020-06-13 RX ADMIN — WATER 2 G: 1 INJECTION INTRAMUSCULAR; INTRAVENOUS; SUBCUTANEOUS at 09:54

## 2020-06-13 RX ADMIN — WATER 2 G: 1 INJECTION INTRAMUSCULAR; INTRAVENOUS; SUBCUTANEOUS at 02:19

## 2020-06-13 RX ADMIN — Medication 10 ML: at 06:20

## 2020-06-13 NOTE — PROGRESS NOTES
@0700 Bedside and Verbal shift change report given to Mai Jacinto RN (oncoming nurse) by Mere Gustafson RN (offgoing nurse). Report included the following information SBAR, Kardex, ED Summary, Procedure Summary, Intake/Output, MAR, Accordion, Recent Results, Med Rec Status, Cardiac Rhythm Vpace and Alarm Parameters . @0275 Dr. Shawna Varghese arrives and exams the patient. Plan of care is to discharge patient. @1000 Discharge instructions provided. PIVs discontinued. Patient has pacemaker card at home verified.  paged. @[phone] Patient discharge.

## 2020-06-13 NOTE — PROGRESS NOTES
No recurrent chest pain  CXR normal  Pacer site - dressing in place, dry  Lungs clear  Heart - no murmur or rub  Extremities - no edema      Home today  Pacer instructions  followup arranged

## 2020-06-22 ENCOUNTER — OFFICE VISIT (OUTPATIENT)
Dept: CARDIOLOGY CLINIC | Age: 60
End: 2020-06-22

## 2020-06-22 DIAGNOSIS — Z51.89 VISIT FOR WOUND CHECK: ICD-10-CM

## 2020-06-22 DIAGNOSIS — Z95.0 PACEMAKER: Primary | ICD-10-CM

## 2020-06-22 NOTE — PROGRESS NOTES
Patient presents for wound check post-device implantation (dual chamber pacemaker). The dressing was removed and the site was inspected. The site appeared to be well-healing without ecchymosis/tenderness/erythema. Denies pain, fevers, discharge. Plan:    Continue follow up in device clinic as planned.        Rubio Ding NP

## 2020-09-18 ENCOUNTER — OFFICE VISIT (OUTPATIENT)
Dept: CARDIOLOGY CLINIC | Age: 60
End: 2020-09-18
Payer: COMMERCIAL

## 2020-09-18 ENCOUNTER — CLINICAL SUPPORT (OUTPATIENT)
Dept: CARDIOLOGY CLINIC | Age: 60
End: 2020-09-18
Payer: COMMERCIAL

## 2020-09-18 VITALS
DIASTOLIC BLOOD PRESSURE: 70 MMHG | HEART RATE: 73 BPM | OXYGEN SATURATION: 94 % | BODY MASS INDEX: 29.39 KG/M2 | SYSTOLIC BLOOD PRESSURE: 110 MMHG | WEIGHT: 217 LBS | HEIGHT: 72 IN

## 2020-09-18 DIAGNOSIS — Z95.0 CARDIAC PACEMAKER IN SITU: Primary | ICD-10-CM

## 2020-09-18 DIAGNOSIS — E78.00 PURE HYPERCHOLESTEROLEMIA: ICD-10-CM

## 2020-09-18 DIAGNOSIS — Z95.0 PACEMAKER: Primary | ICD-10-CM

## 2020-09-18 DIAGNOSIS — I44.1 SECOND DEGREE AV BLOCK, MOBITZ TYPE II: ICD-10-CM

## 2020-09-18 PROCEDURE — 93280 PM DEVICE PROGR EVAL DUAL: CPT

## 2020-09-18 PROCEDURE — 93288 INTERROG EVL PM/LDLS PM IP: CPT

## 2020-09-18 PROCEDURE — 99215 OFFICE O/P EST HI 40 MIN: CPT | Performed by: INTERNAL MEDICINE

## 2020-09-18 RX ORDER — LOSARTAN POTASSIUM 50 MG/1
50 TABLET ORAL 2 TIMES DAILY
COMMUNITY
End: 2022-11-02

## 2020-09-18 NOTE — PROGRESS NOTES
Amber Faulkner is a 61 y.o. female    Visit Vitals  LMP 01/17/1999       Chief Complaint   Patient presents with    Other     Device check       Chest pain no  SOB no  Dizziness no  Swelling no  Recent hospital visit no  Refills no

## 2020-09-18 NOTE — PROGRESS NOTES
HISTORY OF PRESENTING ILLNESS      Sharon Villalpando is a 61 y.o. female hypertension, hypercholesterolemia and recent complaints of chest pain, fatigue and shortness of breath. Positive family hx for CAD. She had normal LVEF on echocardiogram with Dr. Fiorella Waters 6/10/200. She also had EKG showing 2nd degree AVB 2:1.Cardiac catheterization demonstrated normal coronaries and an EF of 70%. EKG in post procedure area showing 2:1 heart block with heart rates ranging 35-42bpm. She underwent dual chamber pacemaker for second degree heart block and now presents for follow up. Device interrogation demonstrates normal functioning.         PAST MEDICAL HISTORY     Past Medical History:   Diagnosis Date    Fibromyalgia     GERD (gastroesophageal reflux disease)     Kidney stone     Pure hypercholesterolemia     Second degree AV block, Mobitz type II 2020           PAST SURGICAL HISTORY     Past Surgical History:   Procedure Laterality Date    HX GYN  1999    Hysterectomy    HX GYN      tubal    HX ORTHOPAEDIC      right elbow     MD INS NEW/RPLCMT PRM PM W/TRANSV ELTRD ATRIAL&VENT N/A 2020    PPM Dual/MEDTRONIC performed by Basil Kim MD at [facility] CATH LAB          ALLERGIES     No Known Allergies       FAMILY HISTORY     Family History   Problem Relation Age of Onset    Cancer Mother         stomach    Asthma Father     Heart Disease Father     Asthma Brother     negative for cardiac disease       SOCIAL HISTORY     Social History     Socioeconomic History    Marital status:      Spouse name: Not on file    Number of children: Not on file    Years of education: Not on file    Highest education level: Not on file   Tobacco Use    Smoking status: Former Smoker     Last attempt to quit: 1991     Years since quittin.2    Smokeless tobacco: Never Used   Substance and Sexual Activity    Alcohol use: Yes     Comment: occasionally    Drug use: No    Sexual activity: Yes Partners: Male         MEDICATIONS     Current Outpatient Medications   Medication Sig    DULoxetine (Cymbalta) 60 mg capsule Take 60 mg by mouth nightly.  atorvastatin (LIPITOR) 20 mg tablet Take 20 mg by mouth daily.  zolpidem (AMBIEN) 10 mg tablet TAKE 1 TABLET BY MOUTH NIGHTLY AS NEEDED    butalbital-acetaminophen-caffeine (FIORICET) -40 mg per tablet Take 1 Tab by mouth every six (6) hours as needed for Headache.  valACYclovir (VALTREX) 1 g tablet Take 1 Tab by mouth two (2) times a day.  naproxen sodium (ALEVE) 220 mg cap Take  by mouth.  aspirin delayed-release 81 mg tablet Take  by mouth daily.  Dexlansoprazole (DEXILANT) 60 mg CpDM Take 1 Cap by mouth daily. No current facility-administered medications for this visit. I have reviewed the nurses notes, vitals, problem list, allergy list, medical history, family, social history and medications. REVIEW OF SYMPTOMS      General: Pt denies excessive weight gain or loss. Pt is able to conduct ADL's  HEENT: Denies blurred vision, headaches, hearing loss, epistaxis and difficulty swallowing. Respiratory: Denies cough, congestion, shortness of breath, JUNG, wheezing or stridor. Cardiovascular: Denies precordial pain, palpitations, edema or PND  Gastrointestinal: Denies poor appetite, indigestion, abdominal pain or blood in stool  Genitourinary: Denies hematuria, dysuria, increased urinary frequency  Musculoskeletal: Denies joint pain or swelling from muscles or joints  Neurologic: Denies tremor, paresthesias, headache, or sensory motor disturbance  Psychiatric: Denies confusion, insomnia, depression  Integumentray: Denies rash, itching or ulcers. Hematologic: Denies easy bruising, bleeding       PHYSICAL EXAMINATION      Vitals: see vitals section  General: Well developed, in no acute distress.   HEENT: No jaundice, oral mucosa moist, no oral ulcers  Neck: Supple, no stiffness, no lymphadenopathy, supple  Heart: Normal S1/S2 negative S3 or S4. Regular, no murmur, gallop or rub, no jugular venous distention  Respiratory: Clear bilaterally x 4, no wheezing or rales  Abdomen:   Soft, non-tender, bowel sounds are active. Extremities:  No edema, normal cap refill, no cyanosis. Musculoskeletal: No clubbing, no deformities  Neuro: A&Ox3, speech clear, gait stable, cooperative, no focal neurologic deficits  Skin: Skin color is normal. No rashes or lesions. Non diaphoretic, moist.  Vascular: 2+ pulses symmetric in all extremities       DIAGNOSTIC DATA      EKG:        LABORATORY DATA      Lab Results   Component Value Date/Time    WBC 9.8 06/13/2020 04:13 AM    HGB 14.4 06/13/2020 04:13 AM    HCT 42.3 06/13/2020 04:13 AM    PLATELET 339 16/47/2279 04:13 AM    MCV 92.2 06/13/2020 04:13 AM      Lab Results   Component Value Date/Time    Sodium 138 06/13/2020 04:13 AM    Potassium 4.1 06/13/2020 04:13 AM    Chloride 108 06/13/2020 04:13 AM    CO2 26 06/13/2020 04:13 AM    Anion gap 4 (L) 06/13/2020 04:13 AM    Glucose 139 (H) 06/13/2020 04:13 AM    BUN 13 06/13/2020 04:13 AM    Creatinine 0.91 06/13/2020 04:13 AM    BUN/Creatinine ratio 14 06/13/2020 04:13 AM    GFR est AA >60 06/13/2020 04:13 AM    GFR est non-AA >60 06/13/2020 04:13 AM    Calcium 8.6 06/13/2020 04:13 AM    Bilirubin, total 0.5 03/21/2014 11:14 AM    Alk. phosphatase 41 03/21/2014 11:14 AM    Protein, total 6.0 03/21/2014 11:14 AM    Albumin 4.0 03/21/2014 11:14 AM    Globulin 2.9 10/28/2010 11:36 AM    A-G Ratio 2.0 03/21/2014 11:14 AM    ALT (SGPT) 27 03/21/2014 11:14 AM           ASSESSMENT      1. Pacemaker  2. Hypertension  3. Fatigue  4. Pre-syncope  5. Second Degree AVB 2:1       PLAN     Continue follow up in device clinic        ICD-10-CM ICD-9-CM    1. Pacemaker  Z95.0 V45.01    2. Second degree AV block, Mobitz type II  I44.1 426.12    3. Pure hypercholesterolemia  E78.00 272.0      No orders of the defined types were placed in this encounter. FOLLOW-UP     1 year      Thank you, Edgar Andrew MD  And Dr. Joselin Saunders for allowing me to participate in the care of this extraordinarily pleasant female. Please do not hesitate to contact me for further questions/concerns.          Clinton Diaz MD  Cardiac Electrophysiology / Cardiology    [address]  [facility], [address]    [address] [facility]  [phone] / [phone] Fax   [phone] / [phone] Fax

## 2020-12-22 ENCOUNTER — OFFICE VISIT (OUTPATIENT)
Dept: CARDIOLOGY CLINIC | Age: 60
End: 2020-12-22
Payer: COMMERCIAL

## 2020-12-22 DIAGNOSIS — Z95.0 CARDIAC PACEMAKER IN SITU: Primary | ICD-10-CM

## 2020-12-22 PROCEDURE — 93294 REM INTERROG EVL PM/LDLS PM: CPT | Performed by: INTERNAL MEDICINE

## 2020-12-23 NOTE — PROGRESS NOTES
c/MDT pacer remote. Normal & appropriate pacer function. 100% RV pacing. 2.9% RA pacing. No recorded events     See scanned document for details.

## 2021-08-21 ENCOUNTER — APPOINTMENT (OUTPATIENT)
Dept: GENERAL RADIOLOGY | Age: 61
End: 2021-08-21
Attending: EMERGENCY MEDICINE
Payer: COMMERCIAL

## 2021-08-21 ENCOUNTER — HOSPITAL ENCOUNTER (EMERGENCY)
Age: 61
Discharge: HOME | End: 2021-08-21
Attending: EMERGENCY MEDICINE
Payer: COMMERCIAL

## 2021-08-21 VITALS
HEIGHT: 72 IN | DIASTOLIC BLOOD PRESSURE: 65 MMHG | TEMPERATURE: 99.3 F | BODY MASS INDEX: 27.47 KG/M2 | HEART RATE: 101 BPM | SYSTOLIC BLOOD PRESSURE: 99 MMHG | WEIGHT: 202.82 LBS | RESPIRATION RATE: 16 BRPM | OXYGEN SATURATION: 95 %

## 2021-08-21 DIAGNOSIS — U07.1 COVID-19: Primary | ICD-10-CM

## 2021-08-21 PROCEDURE — 99283 EMERGENCY DEPT VISIT LOW MDM: CPT

## 2021-08-21 PROCEDURE — 71045 X-RAY EXAM CHEST 1 VIEW: CPT

## 2021-08-21 RX ORDER — IBUPROFEN 200 MG
200 TABLET ORAL
COMMUNITY
End: 2021-10-08

## 2021-08-21 RX ORDER — OMEPRAZOLE 40 MG/1
40 CAPSULE, DELAYED RELEASE ORAL DAILY
COMMUNITY

## 2021-08-21 RX ORDER — PREDNISONE 20 MG/1
20 TABLET ORAL DAILY
Qty: 5 TABLET | Refills: 0 | Status: SHIPPED | OUTPATIENT
Start: 2021-08-21 | End: 2021-08-26

## 2021-08-21 RX ORDER — ACETAMINOPHEN 325 MG/1
650 TABLET ORAL
COMMUNITY
End: 2021-10-08

## 2021-08-21 NOTE — ED PROVIDER NOTES
HPI the patient tested positive for Covid on . She also had another positive test 3 days ago. She presents with cough, myalgias and anxiety. She denies having shortness of breath, chest pain, fever, nausea/vomiting/diarrhea or other complaints. Past Medical History:   Diagnosis Date    Fibromyalgia     GERD (gastroesophageal reflux disease)     Kidney stone     Pure hypercholesterolemia     Second degree AV block, Mobitz type II 2020       Past Surgical History:   Procedure Laterality Date    HX GYN  1999    Hysterectomy    HX GYN      tubal    HX ORTHOPAEDIC      right elbow     IN INS NEW/RPLCMT PRM PM W/TRANSV ELTRD ATRIAL&VENT N/A 2020    PPM Dual/MEDTRONIC performed by Moreno Quezada MD at [facility] LAB         Family History:   Problem Relation Age of Onset    Cancer Mother         stomach    Asthma Father     Heart Disease Father     Asthma Brother        Social History     Socioeconomic History    Marital status:      Spouse name: Not on file    Number of children: Not on file    Years of education: Not on file    Highest education level: Not on file   Occupational History    Not on file   Tobacco Use    Smoking status: Former Smoker     Quit date: 1991     Years since quittin.1    Smokeless tobacco: Never Used   Substance and Sexual Activity    Alcohol use: Yes     Comment: occasionally    Drug use: No    Sexual activity: Yes     Partners: Male   Other Topics Concern    Not on file   Social History Narrative    Not on file     Social Determinants of Health     Financial Resource Strain:     Difficulty of Paying Living Expenses:    Food Insecurity:     Worried About Running Out of Food in the Last Year:     [address] in the Last Year:    Transportation Needs:     Lack of Transportation (Medical):      Lack of Transportation (Non-Medical):    Physical Activity:     Days of Exercise per Week:     Minutes of Exercise per Session: Stress:     Feeling of Stress :    Social Connections:     Frequency of Communication with Friends and Family:     Frequency of Social Gatherings with Friends and Family:     Attends Scientologist Services:     Active Member of Clubs or Organizations:     Attends Club or Organization Meetings:     Marital Status:    Intimate Partner Violence:     Fear of Current or Ex-Partner:     Emotionally Abused:     Physically Abused:     Sexually Abused: ALLERGIES: Patient has no known allergies. Review of Systems   Constitutional: Negative for fever. HENT: Negative for voice change. Eyes: Negative for visual disturbance. Respiratory: Positive for cough. Negative for shortness of breath. Cardiovascular: Negative for chest pain. Gastrointestinal: Negative for abdominal pain, nausea and vomiting. Genitourinary: Negative for flank pain. Musculoskeletal: Positive for myalgias. Negative for back pain. Skin: Negative for rash. Neurological: Negative for headaches. Psychiatric/Behavioral: Negative for confusion. Vitals:    08/21/21 1626   BP: (!) 169/90   Pulse: (!) 101   Resp: 16   Temp: 99.3 °F (37.4 °C)   SpO2: 99%   Weight: 92 kg (202 lb 13.2 oz)   Height: 6' (1.829 m)            Physical Exam  Constitutional:       General: She is not in acute distress. Appearance: She is well-developed. HENT:      Head: Normocephalic. Cardiovascular:      Rate and Rhythm: Normal rate. Heart sounds: No murmur heard. Pulmonary:      Effort: Pulmonary effort is normal.      Breath sounds: Normal breath sounds. Musculoskeletal:         General: Normal range of motion. Cervical back: Normal range of motion. Skin:     General: Skin is warm and dry. Capillary Refill: Capillary refill takes less than 2 seconds. Neurological:      Mental Status: She is alert.    Psychiatric:         Behavior: Behavior normal.          MDM       Procedures    5:40 PM--discussed the chest x-ray results with the patient. Her oxygen saturations here have been 98 to 99%. She was given a prescription for prednisone and advised to follow-up with her PCP as needed.

## 2021-08-21 NOTE — ED TRIAGE NOTES
Pt presents to ED with c/o fatigue, myalgias and cough. Pt did home COVID test on 8/11/2021 which was positive. Pt is tearful and anxious and states \"I need someone to save me. \" Oxygen saturations are 99% on room air. Pt was reassured.

## 2021-08-23 ENCOUNTER — PATIENT OUTREACH (OUTPATIENT)
Dept: CASE MANAGEMENT | Age: 61
End: 2021-08-23

## 2021-08-23 NOTE — PROGRESS NOTES
Patient contacted regarding COVID-19 diagnosis. Discussed COVID-19 related testing which was available at this time. Test results were positive. Patient informed of results, if available? yes. Ambulatory Care Manager contacted the patient by telephone to perform post discharge assessment. Call within 2 business days of discharge: Yes Verified name and  with patient as identifiers. Provided introduction to self, and explanation of the CTN/ACM role, and reason for call due to risk factors for infection and/or exposure to COVID-19. Symptoms reviewed with patient who verbalized the following symptoms: fatigue, pain or aching joints, cough, shortness of breath and anxiety      Due to no new or worsening symptoms encounter was not routed to provider for escalation. Discussed follow-up appointments. If no appointment was previously scheduled, appointment scheduling offered:  No. Patient will contact PCP for follow up. [address] follow up appointment(s):   Future Appointments   Date Time Provider Canelo Vincent   10/8/2021  9:20 AM PACEMAKER, STFRANCES [facility] AMB   10/8/2021  9:40 AM Elder Champion NP [facility] AMB   2021 12:00 PM [address] [facility] AMB     Non-[facility] follow up appointment(s): none reported    Interventions to address risk factors: Education of patient/family/caregiver/guardian to support self-management-covid 19. Advance Care Planning:   Does patient have an Advance Directive: not on file; education provided. Educated patient about risk for severe COVID-19 due to risk factors according to CDC guidelines. ACM reviewed discharge instructions, medical action plan and red flag symptoms with the patient who verbalized understanding. Discussed COVID vaccination status: yes. Education provided on COVID-19 vaccination as appropriate. Discussed exposure protocols and quarantine with CDC Guidelines.  Patient was given an opportunity to verbalize any questions and concerns and agrees to contact ACM or health care provider for questions related to their healthcare. Reviewed and educated patient on any new and changed medications related to discharge diagnosis     Was patient discharged with a pulse oximeter? no Discussed and confirmed pulse oximeter discharge instructions and when to notify provider or seek emergency care. ACM provided contact information. plan to follow up in 14 days. based on severity of symptoms and risk factors.  DMB

## 2021-09-13 ENCOUNTER — PATIENT OUTREACH (OUTPATIENT)
Dept: CASE MANAGEMENT | Age: 61
End: 2021-09-13

## 2021-09-13 NOTE — PROGRESS NOTES
Patient resolved from [address] Transitions episode on 9/13/21. Discussed COVID-19 related testing which was available at this time. Test results were positive. Patient informed of results, if available? yes     Patient/family has been provided the following resources and education related to COVID-19:                         Signs, symptoms and red flags related to COVID-19            [facility] exposure and quarantine guidelines            [facility] exposure contact - [phone]            Contact for their local Department of Health                 Patient currently reports that the following symptoms have improved:  fatigue, pain or aching joints and cough. No further outreach scheduled with this CTN/ACM/LPN/HC/ MA. Episode of Care resolved. Patient has this CTN/ACM/LPN/HC/MA contact information if future needs arise.  ANGEL

## 2021-10-08 ENCOUNTER — CLINICAL SUPPORT (OUTPATIENT)
Dept: CARDIOLOGY CLINIC | Age: 61
End: 2021-10-08
Payer: COMMERCIAL

## 2021-10-08 ENCOUNTER — OFFICE VISIT (OUTPATIENT)
Dept: CARDIOLOGY CLINIC | Age: 61
End: 2021-10-08

## 2021-10-08 VITALS
RESPIRATION RATE: 14 BRPM | SYSTOLIC BLOOD PRESSURE: 134 MMHG | HEIGHT: 72 IN | OXYGEN SATURATION: 98 % | BODY MASS INDEX: 28.82 KG/M2 | DIASTOLIC BLOOD PRESSURE: 80 MMHG | WEIGHT: 212.8 LBS | HEART RATE: 72 BPM

## 2021-10-08 DIAGNOSIS — Z95.0 PACEMAKER: Primary | ICD-10-CM

## 2021-10-08 DIAGNOSIS — I44.1 SECOND DEGREE AV BLOCK, MOBITZ TYPE II: ICD-10-CM

## 2021-10-08 DIAGNOSIS — Z95.0 CARDIAC PACEMAKER IN SITU: Primary | ICD-10-CM

## 2021-10-08 PROCEDURE — 93280 PM DEVICE PROGR EVAL DUAL: CPT | Performed by: INTERNAL MEDICINE

## 2021-10-08 PROCEDURE — 99215 OFFICE O/P EST HI 40 MIN: CPT | Performed by: NURSE PRACTITIONER

## 2021-10-08 NOTE — LETTER
10/8/2021    Patient: Tj Shrestha   YOB: 1960   Date of Visit: 10/8/2021     Linda Ogden MD  [address] [facility] 022 [id number]  Via Fax: [phone]    Dear Linda Ogden MD,      Thank you for referring Ms. Luis M Bob to [address] for evaluation. My notes for this consultation are attached. If you have questions, please do not hesitate to call me. I look forward to following your patient along with you.       Sincerely,    Mirza Barakat NP

## 2021-10-08 NOTE — PROGRESS NOTES
Room EP 5  Visit Vitals  /80 (BP 1 Location: Left upper arm, BP Patient Position: Sitting)   Pulse 72   Resp 14   Ht 6' (1.829 m)   Wt 212 lb 12.8 oz (96.5 kg)   LMP 01/17/1999   SpO2 98%   BMI 28.86 kg/m²       Chest pain: no  Shortness of breath: no  Edema: no  Palpitations, Skipped beats, Rapid heartbeat: no  Dizziness: no  Fatigue:no    New diagnosis/Surgeries: OLAF in March, has started CPAP    ER/Hospitalizations: WTC: 8/21/21 COVID+    Refills:no

## 2021-10-08 NOTE — PROGRESS NOTES
HISTORY OF PRESENTING ILLNESS      Monica Kwong is a 64 y.o. female hypertension, hypercholesterolemia, sleep apnea with CPAP therapy, previous normal cardiac catheterization with preserved LV function, who underwent dual chamber pacemaker for second degree heart block. She is doing well, recovering from recent COVID during this past summer. She is feeling improvement since her CPAP therapy has started.      PAST MEDICAL HISTORY     Past Medical History:   Diagnosis Date    Fibromyalgia     GERD (gastroesophageal reflux disease)     Kidney stone     Pure hypercholesterolemia     Second degree AV block, Mobitz type II 2020           PAST SURGICAL HISTORY     Past Surgical History:   Procedure Laterality Date    HX GYN      Hysterectomy    HX GYN      tubal    HX ORTHOPAEDIC      right elbow     PA INS NEW/RPLCMT PRM PM W/TRANSV ELTRD ATRIAL&VENT N/A 2020    PPM Dual/MEDTRONIC performed by Justin Longo MD at [facility] CATH LAB          ALLERGIES     No Known Allergies       FAMILY HISTORY     Family History   Problem Relation Age of Onset    Cancer Mother         stomach    Asthma Father     Heart Disease Father     Asthma Brother     negative for cardiac disease       SOCIAL HISTORY     Social History     Socioeconomic History    Marital status:      Spouse name: Not on file    Number of children: Not on file    Years of education: Not on file    Highest education level: Not on file   Tobacco Use    Smoking status: Former Smoker     Quit date: 1991     Years since quittin.2    Smokeless tobacco: Never Used   Substance and Sexual Activity    Alcohol use: Yes     Comment: occasionally    Drug use: No    Sexual activity: Yes     Partners: Male     Social Determinants of Health     Financial Resource Strain:     Difficulty of Paying Living Expenses:    Food Insecurity:     Worried About Running Out of Food in the Last Year:     Ran Out of Food in the Last Year:    Transportation Needs:     Lack of Transportation (Medical):  Lack of Transportation (Non-Medical):    Physical Activity:     Days of Exercise per Week:     Minutes of Exercise per Session:    Stress:     Feeling of Stress :    Social Connections:     Frequency of Communication with Friends and Family:     Frequency of Social Gatherings with Friends and Family:     Attends Evangelical Services:     Active Member of Clubs or Organizations:     Attends Club or Organization Meetings:     Marital Status:          MEDICATIONS     Current Outpatient Medications   Medication Sig    omeprazole (PRILOSEC) 40 mg capsule Take 40 mg by mouth daily.  ibuprofen (MOTRIN) 200 mg tablet Take 200 mg by mouth.  acetaminophen (TYLENOL) 325 mg tablet Take 650 mg by mouth every four (4) hours as needed for Pain.  losartan (COZAAR) 50 mg tablet Take  by mouth daily.  DULoxetine (Cymbalta) 60 mg capsule Take 60 mg by mouth nightly.  atorvastatin (LIPITOR) 20 mg tablet Take 20 mg by mouth daily.  zolpidem (AMBIEN) 10 mg tablet TAKE 1 TABLET BY MOUTH NIGHTLY AS NEEDED    valACYclovir (VALTREX) 1 g tablet Take 1 Tab by mouth two (2) times a day.  Dexlansoprazole (DEXILANT) 60 mg CpDM Take 1 Cap by mouth daily. No current facility-administered medications for this visit. I have reviewed the nurses notes, vitals, problem list, allergy list, medical history, family, social history and medications. REVIEW OF SYMPTOMS      General: Pt denies excessive weight gain or loss. Pt is able to conduct ADL's  HEENT: Denies blurred vision, headaches, hearing loss, epistaxis and difficulty swallowing. Respiratory: Denies cough, congestion, shortness of breath, JUNG, wheezing or stridor.   Cardiovascular: Denies precordial pain, palpitations, edema or PND  Gastrointestinal: Denies poor appetite, indigestion, abdominal pain or blood in stool  Genitourinary: Denies hematuria, dysuria, increased urinary frequency  Musculoskeletal: Denies joint pain or swelling from muscles or joints  Neurologic: Denies tremor, paresthesias, headache, or sensory motor disturbance  Psychiatric: Denies confusion, insomnia, depression  Integumentray: Denies rash, itching or ulcers. Hematologic: Denies easy bruising, bleeding       PHYSICAL EXAMINATION      Vitals: see vitals section  General: Well developed, in no acute distress. HEENT: No jaundice, oral mucosa moist, no oral ulcers  Neck: Supple, no stiffness, no lymphadenopathy, supple  Heart:  Normal S1/S2 negative S3 or S4. Regular, no murmur, gallop or rub, no jugular venous distention  Respiratory: Clear bilaterally x 4, no wheezing or rales  Abdomen:   Soft, non-tender, bowel sounds are active. Extremities:  No edema, normal cap refill, no cyanosis. Musculoskeletal: No clubbing, no deformities  Neuro: A&Ox3, speech clear, gait stable, cooperative, no focal neurologic deficits  Skin: Skin color is normal. No rashes or lesions. Non diaphoretic, moist.  Vascular: 2+ pulses symmetric in all extremities       DIAGNOSTIC DATA      EKG:        LABORATORY DATA      Lab Results   Component Value Date/Time    WBC 9.8 06/13/2020 04:13 AM    HGB 14.4 06/13/2020 04:13 AM    HCT 42.3 06/13/2020 04:13 AM    PLATELET 826 60/84/5843 04:13 AM    MCV 92.2 06/13/2020 04:13 AM      Lab Results   Component Value Date/Time    Sodium 138 06/13/2020 04:13 AM    Potassium 4.1 06/13/2020 04:13 AM    Chloride 108 06/13/2020 04:13 AM    CO2 26 06/13/2020 04:13 AM    Anion gap 4 (L) 06/13/2020 04:13 AM    Glucose 139 (H) 06/13/2020 04:13 AM    BUN 13 06/13/2020 04:13 AM    Creatinine 0.91 06/13/2020 04:13 AM    BUN/Creatinine ratio 14 06/13/2020 04:13 AM    GFR est AA >60 06/13/2020 04:13 AM    GFR est non-AA >60 06/13/2020 04:13 AM    Calcium 8.6 06/13/2020 04:13 AM    Bilirubin, total 0.5 03/21/2014 11:14 AM    Alk.  phosphatase 41 03/21/2014 11:14 AM    Protein, total 6.0 03/21/2014 11:14 AM Albumin 4.0 03/21/2014 11:14 AM    Globulin 2.9 10/28/2010 11:36 AM    A-G Ratio 2.0 03/21/2014 11:14 AM    ALT (SGPT) 27 03/21/2014 11:14 AM           ASSESSMENT      1. Pacemaker  2. Hypertension  3. Fatigue  4. Pre-syncope  5. Second Degree AVB 2:1       PLAN     Continue follow up in device clinic remotely every 3 months. Follow up in office in one year. She is feeling improvement infatigue with recent CPAP therapy. Follow up as planned with Dr. Hunter Adair, consider echocardiogram given recent COVID infection and 99% RVP. FOLLOW-UP     1 year    Thank you, Claude Marie MD  And Dr. Hunter Adair for allowing me to participate in the care of this extraordinarily pleasant female. Please do not hesitate to contact me for further questions/concerns.        Daria Kansas, NP    [place] [facility] 92.  [address] [facility], [address], [facility] [address]  [phone] / [phone] Fax   [phone] / [phone] Fax

## 2022-01-10 ENCOUNTER — OFFICE VISIT (OUTPATIENT)
Dept: CARDIOLOGY CLINIC | Age: 62
End: 2022-01-10
Payer: COMMERCIAL

## 2022-01-10 DIAGNOSIS — Z95.0 CARDIAC PACEMAKER IN SITU: Primary | ICD-10-CM

## 2022-01-10 PROCEDURE — 93294 REM INTERROG EVL PM/LDLS PM: CPT | Performed by: INTERNAL MEDICINE

## 2022-01-10 PROCEDURE — 93296 REM INTERROG EVL PM/IDS: CPT | Performed by: INTERNAL MEDICINE

## 2022-01-10 NOTE — LETTER
1/12/2022 1:31 PM    Ms. Rodney Pyle  [address] [address]    Dear Ms. Rodney Pyle,    We have received your recent remote monitor check of your implanted device on 1/10/22. Your remaining estimated battery life is 11.2 years and your device is working normally & appropriately. On December 3, 2021 around 9:00 - 9:15 pm you had multiple fast heart rates up to 160 beats per minute. These were your only episodes this session and are isolated. We will continue to monitor for any changes and let you know if we see a pattern we are concerned about. Your next remote monitor check is scheduled for 4/12/2022. This is NOT an in-clinic appointment. This transmission is sent from your home monitor. Please make sure your home monitor is plugged into power and within 10 feet of where you sleep. If you have difficulty sending a transmission, please do NOT call our office. Instead, call tech support for your device as they are better able to assist.    [facility])   [phone]    Your next clinic/office check is scheduled for Monday, 10/10/2022 at 2:40 pm .  You will have your device checked then see the provider. Please bring a complete list of your medications with strengths and dosages to this appointment. Also, be prepared to discuss any symptoms you may be having since your last visit. Please plan to arrive 10 minutes early to allow time for check-in. [email or web address] is CLOSED once again, due to [address]. The parking lot entrance that is next to [address] is also CLOSED. If you have difficulty walking from the parking lot, please arrange to have someone drop you off to allow time to check into the office. You are allowed to have one visitor accompany you to your appointment and no children under the age of 13 are allowed. Masks are mandatory while in the building.     If you have any questions, please call the Pacemaker/ICD clinic at the [facility]. [place] location at [phone]. We appreciate you staying remotely connected! Sincerely,    Jennie BENITESN, RN  Cardiac Device Clinic Coordinator  [facility] [address] [place] [address] 82.  [address]  [phone]

## 2022-03-18 PROBLEM — I44.1 SECOND DEGREE AV BLOCK, MOBITZ TYPE II: Status: ACTIVE | Noted: 2020-06-11

## 2022-03-19 PROBLEM — R07.9 CHEST PAIN: Status: ACTIVE | Noted: 2020-06-12

## 2022-04-12 ENCOUNTER — OFFICE VISIT (OUTPATIENT)
Dept: CARDIOLOGY CLINIC | Age: 62
End: 2022-04-12
Payer: COMMERCIAL

## 2022-04-12 DIAGNOSIS — Z95.0 CARDIAC PACEMAKER IN SITU: Primary | ICD-10-CM

## 2022-04-12 PROCEDURE — 93296 REM INTERROG EVL PM/IDS: CPT | Performed by: INTERNAL MEDICINE

## 2022-04-12 PROCEDURE — 93294 REM INTERROG EVL PM/LDLS PM: CPT | Performed by: INTERNAL MEDICINE

## 2022-06-17 ENCOUNTER — TELEPHONE (OUTPATIENT)
Dept: CARDIOLOGY CLINIC | Age: 62
End: 2022-06-17

## 2022-06-17 NOTE — TELEPHONE ENCOUNTER
Patient has been scheduled for MRI for her knee. Has been rescheduled to different locations near [facility] not equipped to do MRI for a patient who has a pacemaker & she would need a cardio nurse in the room with her during MRI. Patient would like to know if she can do it near our location, if she does need to be around cardio for the MRI and/or what the best route was.      [phone]

## 2022-06-20 NOTE — TELEPHONE ENCOUNTER
Received call from patient, ID verified using two patient identifiers. Patient calling because she is scheduled for a MRI with her orthopedic MD in [place], [place]. She is running into scheduling difficulty because she has a pacemaker. Patient would like to know if she can have her MRI scheduled here at [facility] because she lives in [place]. Advised patient that she would have to reach out to her orthopedic doctor to have him send the order to the MRI department at [facility]. I'm not sure if he would be able to receive the results so she will have to contact them to see. Advised patient that typically the MRI department faxes a device form for the pacemaker clinic to fill out and then the MRI department arranges the procedure with the device rep. Patient verbalized understanding and will call with any other questions.

## 2022-07-14 ENCOUNTER — OFFICE VISIT (OUTPATIENT)
Dept: CARDIOLOGY CLINIC | Age: 62
End: 2022-07-14
Payer: COMMERCIAL

## 2022-07-14 DIAGNOSIS — Z95.0 CARDIAC PACEMAKER IN SITU: Primary | ICD-10-CM

## 2022-07-14 PROCEDURE — 93294 REM INTERROG EVL PM/LDLS PM: CPT | Performed by: INTERNAL MEDICINE

## 2022-07-14 PROCEDURE — 93296 REM INTERROG EVL PM/IDS: CPT | Performed by: INTERNAL MEDICINE

## 2022-11-01 PROBLEM — Z95.0 PACEMAKER: Status: ACTIVE | Noted: 2022-11-01

## 2022-11-01 PROBLEM — I10 HYPERTENSION: Status: ACTIVE | Noted: 2022-11-01

## 2022-11-02 ENCOUNTER — CLINICAL SUPPORT (OUTPATIENT)
Dept: CARDIOLOGY CLINIC | Age: 62
End: 2022-11-02
Payer: COMMERCIAL

## 2022-11-02 ENCOUNTER — OFFICE VISIT (OUTPATIENT)
Dept: CARDIOLOGY CLINIC | Age: 62
End: 2022-11-02

## 2022-11-02 VITALS
DIASTOLIC BLOOD PRESSURE: 88 MMHG | HEIGHT: 72 IN | OXYGEN SATURATION: 98 % | WEIGHT: 199 LBS | SYSTOLIC BLOOD PRESSURE: 120 MMHG | HEART RATE: 82 BPM | BODY MASS INDEX: 26.95 KG/M2

## 2022-11-02 DIAGNOSIS — E78.00 PURE HYPERCHOLESTEROLEMIA: ICD-10-CM

## 2022-11-02 DIAGNOSIS — I44.1 SECOND DEGREE AV BLOCK, MOBITZ TYPE II: Primary | ICD-10-CM

## 2022-11-02 DIAGNOSIS — Z95.0 CARDIAC PACEMAKER IN SITU: Primary | ICD-10-CM

## 2022-11-02 DIAGNOSIS — I10 PRIMARY HYPERTENSION: ICD-10-CM

## 2022-11-02 DIAGNOSIS — Z95.0 PACEMAKER: ICD-10-CM

## 2022-11-02 PROCEDURE — 3078F DIAST BP <80 MM HG: CPT | Performed by: INTERNAL MEDICINE

## 2022-11-02 PROCEDURE — 3074F SYST BP LT 130 MM HG: CPT | Performed by: INTERNAL MEDICINE

## 2022-11-02 PROCEDURE — 93280 PM DEVICE PROGR EVAL DUAL: CPT | Performed by: INTERNAL MEDICINE

## 2022-11-02 PROCEDURE — 99214 OFFICE O/P EST MOD 30 MIN: CPT | Performed by: INTERNAL MEDICINE

## 2022-11-02 PROCEDURE — 93000 ELECTROCARDIOGRAM COMPLETE: CPT | Performed by: INTERNAL MEDICINE

## 2022-11-02 RX ORDER — CHLORTHALIDONE 25 MG/1
25 TABLET ORAL DAILY
COMMUNITY

## 2022-11-02 RX ORDER — AMLODIPINE BESYLATE 5 MG/1
TABLET ORAL
COMMUNITY
Start: 2022-08-24

## 2022-11-02 RX ORDER — FENOFIBRATE 160 MG/1
TABLET ORAL
COMMUNITY
Start: 2022-08-24

## 2022-11-02 NOTE — PATIENT INSTRUCTIONS
Remote transmission every 3 month  FU with NP in 1 year  FU with Dr. Cassandra Morrison in 18 months

## 2022-11-02 NOTE — PROGRESS NOTES
Chief Complaint   Patient presents with    Annual Exam    Pacemaker Check    Fatigue    Other     2nd degree AVB     Visit Vitals  /88 (BP 1 Location: Left upper arm, BP Patient Position: Sitting)   Pulse 82   Ht 6' (1.829 m)   Wt 199 lb (90.3 kg)   SpO2 98%   BMI 26.99 kg/m²     Chest pain denied   SOB denied   Palpitations denied   Swelling in hands/feet denied   Dizziness denied   Recent hospital stays denied   Refills denied

## 2022-11-02 NOTE — PROGRESS NOTES
Cardiac Electrophysiology Office Follow-up    NAME: Piter Saeed   :  1960  MRM:  [id number]    Date:  2022         Assessment and Plan:     1. Second degree AV block, Mobitz type II  -     AMB POC EKG ROUTINE W/ 12 LEADS, INTER & REP  2. Pure hypercholesterolemia  -     AMB POC EKG ROUTINE W/ 12 LEADS, INTER & REP  3. Pacemaker  -     AMB POC EKG ROUTINE W/ 12 LEADS, INTER & REP  4. Primary hypertension  -     AMB POC EKG ROUTINE W/ 12 LEADS, INTER & REP       Second-degree AV block  Pacemaker dependent, status post implantation in 2020  - Normal device interrogation with 98.4% ventricular pacing  - Remote transmission every 3 months  - Follow-up with nurse practitioner in 1 year  - Follow-up with me in 18 months    Pacemaker  Dual chamber pacemaker with normal function. Battery life 10.5 years. No new or significant lead issues requiring intervention. No significant arrhythmias noted requiring intervention. Iterative programing was performed to assess lead parameters without any permanent changes. - Atrial ATP turned on  - A paced 4.7%, ventricular paced 98.4%    Hypertension  - Continue amlodipine 5 mg daily  - BP is well controlled on the current regimen. No change in antihypertensive medications today. Recommended routine exercise and low sodium diet. Hyperlipidemia  Continue atorvastatin 10 mg daily      ATTENTION:   This medical record was transcribed using an electronic medical records/speech recognition system. Although proofread, it may and can contain electronic, spelling and other errors. Corrections may be executed at a later time. Please feel free to contact us for any clarifications as needed. Subjective:     Piter Saeed is a 58 y.o. female presenting for follow up of heart block and ppm management. History of hypertension, hypercholesterolemia and recent complaints of chest pain, fatigue and shortness of breath. Positive family hx for CAD. She had normal LVEF on echocardiogram with Dr. Leighton Crystal 6/10/200. She also had EKG showing 2nd degree AVB 2:1.Cardiac catheterization demonstrated normal coronaries and an EF of 70%. EKG in post procedure area showing 2:1 heart block with heart rates ranging 35-42bpm. She underwent dual chamber pacemaker for second degree heart block with Dr. Maida Valencia. Normal device interrogation today. No hospitalization. No shortness of breath. Review of Systems:   12 point review of systems was performed. All negative except for HPI    Exam:     Physical Exam:  /88 (BP 1 Location: Left upper arm, BP Patient Position: Sitting)   Pulse 82   Ht 6' (1.829 m)   Wt 199 lb (90.3 kg)   LMP 01/17/1999   SpO2 98%   BMI 26.99 kg/m²     PHYSICAL EXAM  General:  Alert and oriented, in no acute distress  Head:  Atraumatic, normocephalic  Eyes:  extraocular muscles intact  Neck:  Supple, normal range of motion  Lungs:  Clear to auscultation bilaterally, no wheezes/rales/rhonchi   Cardiovascular:  Regular rate and rhythm, normal S1-S2, no murmurs/rubs/gallops  Abdomen:  Soft, nontender, nondistended, normoactive bowel sounds  Skin:  Intact, no rash  Extremities:, no clubbing, cyanosis, or edema  Musculoskeletal: normal range of motion  Neurological:  Alert and oriented, no focal neurologic deficits  Psychiatric:  Normal mood and affect      Medications:     Current Outpatient Medications   Medication Sig    amLODIPine (NORVASC) 5 mg tablet     fenofibrate (LOFIBRA) 160 mg tablet     chlorthalidone (HYGROTON) 25 mg tablet Take 25 mg by mouth daily. omeprazole (PRILOSEC) 40 mg capsule Take 40 mg by mouth daily. DULoxetine (CYMBALTA) 60 mg capsule Take 60 mg by mouth nightly. atorvastatin (LIPITOR) 10 mg tablet Take 10 mg by mouth daily. zolpidem (AMBIEN) 10 mg tablet TAKE 1 TABLET BY MOUTH NIGHTLY AS NEEDED     No current facility-administered medications for this visit.       Diagnostic Data Review:       Results for orders placed or performed during the hospital encounter of 06/11/20   EKG, 12 LEAD, INITIAL   Result Value Ref Range    Ventricular Rate 39 BPM    Atrial Rate 78 BPM    P-R Interval 204 ms    QRS Duration 78 ms    Q-T Interval 496 ms    QTC Calculation (Bezet) 399 ms    Calculated P Axis 67 degrees    Calculated R Axis 19 degrees    Calculated T Axis 62 degrees    Diagnosis       Sinus rhythm with 2nd degree AV block with 2:1 AV conduction  Abnormal ECG  No previous ECGs available  Confirmed by Lashawn Daugherty MD, [address] ([id number]) on 6/11/2020 10:40:15 AM     Results for orders placed or performed in visit on 01/17/11   AMB POC EKG ROUTINE W/ 12 LEADS, INTER & REP    Impression    NSR @ 70 BPM, poor R wave progression, NSST's anteroseptally      6/12/20: Medtronic dual-chamber pacemaker per Dr. Rob Chiu. Lab Review:     Lab Results   Component Value Date/Time    Cholesterol, total 156 03/21/2014 11:14 AM    HDL Cholesterol 30 (L) 03/21/2014 11:14 AM    LDL, calculated 96 03/21/2014 11:14 AM    Triglyceride 149 03/21/2014 11:14 AM    CHOL/HDL Ratio 5.0 05/21/2010 11:15 AM     Lab Results   Component Value Date/Time    Creatinine 0.91 06/13/2020 04:13 AM     Lab Results   Component Value Date/Time    BUN 13 06/13/2020 04:13 AM     Lab Results   Component Value Date/Time    Potassium 4.1 06/13/2020 04:13 AM     No results found for: HBA1C, IIS1CSFB, RYD5GQPV  Lab Results   Component Value Date/Time    HGB 14.4 06/13/2020 04:13 AM     Lab Results   Component Value Date/Time    PLATELET 310 28/90/2244 04:13 AM     No results for input(s): CPK, CKMB, TROIQ in the last 72 hours. No lab exists for component: CKQMB, CPKMB             ___________________________________________________    An Raisa Schmitt MD, [facility], [address] [place] [address] [facility] [place]  [address]                             [phone]     [facility].  [address], [address] [facility]  [phone]

## 2022-12-05 ENCOUNTER — TRANSCRIBE ORDER (OUTPATIENT)
Dept: SCHEDULING | Age: 62
End: 2022-12-05

## 2022-12-05 DIAGNOSIS — M81.0 AGE-RELATED OSTEOPOROSIS WITHOUT CURRENT PATHOLOGICAL FRACTURE: Primary | ICD-10-CM

## 2023-02-02 ENCOUNTER — OFFICE VISIT (OUTPATIENT)
Dept: CARDIOLOGY CLINIC | Age: 63
End: 2023-02-02
Payer: COMMERCIAL

## 2023-02-02 DIAGNOSIS — Z95.0 CARDIAC PACEMAKER IN SITU: Primary | ICD-10-CM

## 2023-04-22 DIAGNOSIS — M81.0 AGE-RELATED OSTEOPOROSIS WITHOUT CURRENT PATHOLOGICAL FRACTURE: Primary | ICD-10-CM

## 2023-05-12 ENCOUNTER — HOSPITAL ENCOUNTER (EMERGENCY)
Facility: HOSPITAL | Age: 63
Discharge: HOME | End: 2023-05-12
Attending: STUDENT IN AN ORGANIZED HEALTH CARE EDUCATION/TRAINING PROGRAM
Payer: COMMERCIAL

## 2023-05-12 VITALS
WEIGHT: 199.52 LBS | BODY MASS INDEX: 27.02 KG/M2 | TEMPERATURE: 98.4 F | HEART RATE: 85 BPM | OXYGEN SATURATION: 98 % | DIASTOLIC BLOOD PRESSURE: 65 MMHG | SYSTOLIC BLOOD PRESSURE: 123 MMHG | HEIGHT: 72 IN | RESPIRATION RATE: 16 BRPM

## 2023-05-12 DIAGNOSIS — J02.9 ACUTE PHARYNGITIS, UNSPECIFIED ETIOLOGY: Primary | ICD-10-CM

## 2023-05-12 DIAGNOSIS — H65.03 BILATERAL ACUTE SEROUS OTITIS MEDIA, RECURRENCE NOT SPECIFIED: ICD-10-CM

## 2023-05-12 LAB — DEPRECATED S PYO AG THROAT QL EIA: NEGATIVE

## 2023-05-12 PROCEDURE — 87070 CULTURE OTHR SPECIMN AEROBIC: CPT

## 2023-05-12 PROCEDURE — 99283 EMERGENCY DEPT VISIT LOW MDM: CPT

## 2023-05-12 PROCEDURE — 6370000000 HC RX 637 (ALT 250 FOR IP): Performed by: STUDENT IN AN ORGANIZED HEALTH CARE EDUCATION/TRAINING PROGRAM

## 2023-05-12 PROCEDURE — 87880 STREP A ASSAY W/OPTIC: CPT

## 2023-05-12 RX ORDER — LIDOCAINE HYDROCHLORIDE 20 MG/ML
10 SOLUTION OROPHARYNGEAL PRN
Qty: 100 ML | Refills: 0 | Status: SHIPPED | OUTPATIENT
Start: 2023-05-12

## 2023-05-12 RX ORDER — LIDOCAINE HYDROCHLORIDE 20 MG/ML
15 SOLUTION OROPHARYNGEAL
Status: COMPLETED | OUTPATIENT
Start: 2023-05-12 | End: 2023-05-12

## 2023-05-12 RX ORDER — AMOXICILLIN AND CLAVULANATE POTASSIUM 875; 125 MG/1; MG/1
1 TABLET, FILM COATED ORAL 2 TIMES DAILY
Qty: 14 TABLET | Refills: 0 | Status: SHIPPED | OUTPATIENT
Start: 2023-05-12 | End: 2023-05-19

## 2023-05-12 RX ADMIN — LIDOCAINE HYDROCHLORIDE 15 ML: 20 SOLUTION ORAL at 15:27

## 2023-05-12 ASSESSMENT — PAIN DESCRIPTION - ORIENTATION: ORIENTATION: OTHER (COMMENT)

## 2023-05-12 ASSESSMENT — PAIN SCALES - GENERAL
PAINLEVEL_OUTOF10: 3
PAINLEVEL_OUTOF10: 10

## 2023-05-12 ASSESSMENT — PAIN DESCRIPTION - ONSET: ONSET: OTHER (COMMENT)

## 2023-05-12 ASSESSMENT — PAIN DESCRIPTION - LOCATION
LOCATION: THROAT
LOCATION: THROAT

## 2023-05-12 ASSESSMENT — PAIN DESCRIPTION - FREQUENCY: FREQUENCY: CONTINUOUS

## 2023-05-12 NOTE — ED NOTES
Pt was discharged and given instructions by Dr Maurilio Miles. Pt verbalized good understanding of all discharge instructions,prescriptions and F/U care. All questions answered. Pt in stable condition on discharge.        Manjinder Perez RN  05/12/23 1547

## 2023-05-12 NOTE — ED PROVIDER NOTES
[facility] EMERGENCY DEPT  EMERGENCY DEPARTMENT ENCOUNTER      Pt Name: Farrah Rhodes  MRN: [id number]  [place] 1960  Date of evaluation: 5/12/2023  Provider: Jones Drummond MD    [address] [place] [address]       Chief Complaint   Patient presents with    Ear Fullness    Pharyngitis         HISTORY OF PRESENT ILLNESS   (Location/Symptom, Timing/Onset, Context/Setting, Quality, Duration, Modifying Factors, Severity)  Note limiting factors. HPI      Review of External Medical Records:     Nursing Notes were reviewed. REVIEW OF SYSTEMS    (2-9 systems for level 4, 10 or more for level 5)     Review of Systems    Except as noted above the remainder of the review of systems was reviewed and negative.        PAST MEDICAL HISTORY     Past Medical History:   Diagnosis Date    Fibromyalgia     GERD (gastroesophageal reflux disease)     Hypertension 11/1/2022    Kidney stone     Pure hypercholesterolemia     Second degree AV block, Mobitz type II 6/11/2020         SURGICAL HISTORY       Past Surgical History:   Procedure Laterality Date    GYN      tubal    GYN  1999    Hysterectomy    INS NEW/RPLCMT PRM PM W/TRANSV ELTRD ATRIAL&VENT N/A 6/12/2020    PPM Dual/MEDTRONIC performed by Tawana Benjamin MD at [facility] LAB    ORTHOPEDIC SURGERY      right elbow     PACEMAKER INSERTION      TOTAL HIP ARTHROPLASTY Left          CURRENT MEDICATIONS       Previous Medications    AMLODIPINE (NORVASC) 5 MG TABLET    ceived the following from [facility]: Outside name: amLODIPine (NORVASC) 5 mg tablet    ATORVASTATIN (LIPITOR) 10 MG TABLET    Take 1 tablet by mouth daily    CHLORTHALIDONE (HYGROTON) 25 MG TABLET    Take 1 tablet by mouth daily    DULOXETINE (CYMBALTA) 60 MG EXTENDED RELEASE CAPSULE    Take 1 capsule by mouth    FENOFIBRATE (TRIGLIDE) 160 MG TABLET    ceived the following from [facility]: Outside name: fenofibrate (LOFIBRA) 160 mg tablet    OMEPRAZOLE (PRILOSEC) 40 MG DELAYED RELEASE

## 2023-05-12 NOTE — ED TRIAGE NOTES
Pt ambulates to treatment area she states that after flying in from [place] she has had sinus and ear pressure, that she blamed on the flight. As the week has continues she has developed a productive cough with yellow sputum and some red streaks along with a sore throat.   She has been taking a cough drop since last night no other medications

## 2023-05-14 LAB
BACTERIA SPEC CULT: NORMAL
SERVICE CMNT-IMP: NORMAL

## 2023-12-04 ENCOUNTER — PROCEDURE VISIT (OUTPATIENT)
Age: 63
End: 2023-12-04

## 2023-12-04 ENCOUNTER — OFFICE VISIT (OUTPATIENT)
Age: 63
End: 2023-12-04
Payer: COMMERCIAL

## 2023-12-04 VITALS
BODY MASS INDEX: 26.9 KG/M2 | OXYGEN SATURATION: 98 % | DIASTOLIC BLOOD PRESSURE: 78 MMHG | HEART RATE: 68 BPM | WEIGHT: 198.6 LBS | RESPIRATION RATE: 14 BRPM | HEIGHT: 72 IN | SYSTOLIC BLOOD PRESSURE: 120 MMHG

## 2023-12-04 DIAGNOSIS — Z95.0 PRESENCE OF CARDIAC PACEMAKER: ICD-10-CM

## 2023-12-04 DIAGNOSIS — Z95.0 PACEMAKER: Primary | ICD-10-CM

## 2023-12-04 DIAGNOSIS — I44.1 SECOND DEGREE AV BLOCK, MOBITZ TYPE II: ICD-10-CM

## 2023-12-04 DIAGNOSIS — I10 PRIMARY HYPERTENSION: ICD-10-CM

## 2023-12-04 DIAGNOSIS — I44.1 ATRIOVENTRICULAR BLOCK, SECOND DEGREE: Primary | ICD-10-CM

## 2023-12-04 PROCEDURE — 3074F SYST BP LT 130 MM HG: CPT | Performed by: NURSE PRACTITIONER

## 2023-12-04 PROCEDURE — 3078F DIAST BP <80 MM HG: CPT | Performed by: NURSE PRACTITIONER

## 2023-12-04 PROCEDURE — 99214 OFFICE O/P EST MOD 30 MIN: CPT | Performed by: NURSE PRACTITIONER

## 2023-12-04 NOTE — PROGRESS NOTES
Cardiac Electrophysiology OFFICE Follow Up Note             Assessment/Plan:   1. Pacemaker  2. Second degree AV block, Mobitz type II  3. Primary hypertension      Medtronic dual chamber pacemaker   9.2 years on battery   6.1% A-paced  98.7% V-paced  No AF, No VT/VF  SVT x2 minutes June 2023 - asymptomatic   - Continue Q3 month remote device transmissions. Follow-up in the EP clinic in one year, or sooner for any concerns. Second-degree AV block  Pacemaker dependent, status post implantation in June 12, 2020  Recent echo with Dr. Marlon Ca showed normal EF per patient   - Continue to monitor EF due to high burden of RV pacing     Hypertension  BP controlled  - Continue amlodipine                                                                         Hyperlipidemia  - Continue atorvastatin 10 mg daily       Subjective:         Leonardo Randall is a 61 y.o. patient who is seen for follow up of heart block and ppm management. History of hypertension, hypercholesterolemia. She underwent dual chamber pacemaker for second degree heart block with Dr. Bailee Red. She is doing well from a cardiac standpoint and denies chest pain, palpitations, dyspnea, dizziness or syncope.          Patient Active Problem List   Diagnosis    History of recurrent UTIs    Second degree AV block, Mobitz type II    Rheumatoid factor positive    Pure hypercholesterolemia    Insomnia, unspecified    Chest pain    Fibromyalgia    Symptomatic menopausal or female climacteric states    Pacemaker    Hypertension     Current Outpatient Medications   Medication Sig Dispense Refill    Acetaminophen (TYLENOL 8 HOUR ARTHRITIS PAIN PO) Take 1 tablet by mouth 2 times daily      amLODIPine (NORVASC) 5 MG tablet ceived the following from [facility]: Outside name: amLODIPine (NORVASC) 5 mg tablet      atorvastatin (LIPITOR) 10 MG tablet Take 1 tablet by mouth daily      DULoxetine (CYMBALTA) 60 MG extended release

## 2024-05-05 PROCEDURE — 93294 REM INTERROG EVL PM/LDLS PM: CPT | Performed by: INTERNAL MEDICINE

## 2024-05-05 PROCEDURE — 93296 REM INTERROG EVL PM/IDS: CPT | Performed by: INTERNAL MEDICINE

## 2024-08-23 ENCOUNTER — TELEPHONE (OUTPATIENT)
Age: 64
End: 2024-08-23

## 2024-08-23 NOTE — TELEPHONE ENCOUNTER
Telephone call placed to patient.  Identity verified with two patient identifiers.  Patient states this past Monday, 8/19/2024 she started having CP radiating to her jaw, but consistently has these same symptoms with her acid reflux.  Pt states she typically follows up with Dr. Boone for general cardiology, and got an appt w/ him on 9/12/24.  Pt was wondering if device clinic saw any episodes on her pacemaker.  Pt educated on difference between EP and general cardiology and heart attack symptoms for women.  Pt sent manual transmission demonstrating device and rhythm are WNL.  Pt states she is still having these pains, but not as bad.  Pt encouraged to seek emergency services for continued symptoms and to follow up with Dr. Boone's office.     Opportunities for questions, clarifications, and concerns provided.  Pt expressed understanding.

## 2024-08-23 NOTE — TELEPHONE ENCOUNTER
Patient called in stating that she would please like an call from an device nurse please .       Patient # ~ [phone]

## 2025-05-09 PROCEDURE — 93294 REM INTERROG EVL PM/LDLS PM: CPT | Performed by: INTERNAL MEDICINE

## 2025-05-09 PROCEDURE — 93296 REM INTERROG EVL PM/IDS: CPT | Performed by: INTERNAL MEDICINE

## 2025-07-28 ENCOUNTER — TRANSCRIBE ORDERS (OUTPATIENT)
Facility: HOSPITAL | Age: 65
End: 2025-07-28

## 2025-07-28 DIAGNOSIS — M54.16 RIGHT LUMBAR RADICULOPATHY: ICD-10-CM

## 2025-07-28 DIAGNOSIS — M54.16 LUMBAR RADICULOPATHY: ICD-10-CM

## 2025-07-28 DIAGNOSIS — M25.571 ACUTE RIGHT ANKLE PAIN: Primary | ICD-10-CM

## (undated) DEVICE — DRESSING HEMOSTATIC SFT INTVENT W/O SLT DBL WRP QUIKCLOT LF

## (undated) DEVICE — CATH DIAG-D 6F MULTI PIG 155 5 -- IMPULSE 16599-302

## (undated) DEVICE — Z DUPLICATE USE 2275497 DRSG POSTOP PRMSL AG 3.5X6IN

## (undated) DEVICE — INTRO SHTH 7FR 13X20CM -- TEARAWAY

## (undated) DEVICE — LIMB HOLDER, WRIST/ANKLE: Brand: DEROYAL

## (undated) DEVICE — Device

## (undated) DEVICE — 3M™ IOBAN™ 2 ANTIMICROBIAL INCISE DRAPE 6640EZ: Brand: IOBAN™ 2

## (undated) DEVICE — PINNACLE INTRODUCER SHEATH: Brand: PINNACLE

## (undated) DEVICE — MEDI-TRACE CADENCE ADULT, DEFIBRILLATION ELECTRODE -RTS  (10 PR/PK) - PHYSIO-CONTROL: Brand: MEDI-TRACE CADENCE

## (undated) DEVICE — NEEDLE ANGIO 18GA L9CM NRML 1 WALL SMOOTH FINISH CLR HUB FOR

## (undated) DEVICE — PACEMAKER PACK: Brand: MEDLINE INDUSTRIES, INC.

## (undated) DEVICE — SUTURE MCRYL SZ 4-0 L27IN ABSRB UD SH L26MM 1/2 CIR Y415H

## (undated) DEVICE — PACK PROCEDURE SURG HRT CATH

## (undated) DEVICE — PROCEDURE KIT FLUID MGMT CUST MAINFOLD STRL

## (undated) DEVICE — SUTURE STRATAFIX SPRL PDS + SZ 2-0 L9IN ABSRB VLT CT-1 SXPP1B456

## (undated) DEVICE — SUTURE ETHBND EXCEL SZ 2 L30IN NONABSORBABLE GRN L40MM V-37 MX69G

## (undated) DEVICE — REM POLYHESIVE ADULT PATIENT RETURN ELECTRODE: Brand: VALLEYLAB

## (undated) DEVICE — SUTURE STRATAFIX SPRL MCRYL + SZ 3-0 L24IN ABSRB UD PS-2 SXMP1B108

## (undated) DEVICE — MEDI-TRACE CADENCE ADULT, DEFIBRILLATION ELECTRODE -RTS  (10 PR/PK) - PHILIPS: Brand: MEDI-TRACE CADENCE